# Patient Record
Sex: MALE | Race: BLACK OR AFRICAN AMERICAN | NOT HISPANIC OR LATINO | URBAN - METROPOLITAN AREA
[De-identification: names, ages, dates, MRNs, and addresses within clinical notes are randomized per-mention and may not be internally consistent; named-entity substitution may affect disease eponyms.]

---

## 2017-11-01 ENCOUNTER — INPATIENT (INPATIENT)
Facility: HOSPITAL | Age: 32
LOS: 0 days | Discharge: HOME | DRG: 638 | End: 2017-11-02
Attending: HOSPITALIST | Admitting: HOSPITALIST
Payer: COMMERCIAL

## 2017-11-01 VITALS
HEART RATE: 65 BPM | OXYGEN SATURATION: 98 % | SYSTOLIC BLOOD PRESSURE: 115 MMHG | RESPIRATION RATE: 18 BRPM | TEMPERATURE: 97 F | WEIGHT: 154.98 LBS | HEIGHT: 68 IN | DIASTOLIC BLOOD PRESSURE: 90 MMHG

## 2017-11-01 DIAGNOSIS — Z29.9 ENCOUNTER FOR PROPHYLACTIC MEASURES, UNSPECIFIED: ICD-10-CM

## 2017-11-01 DIAGNOSIS — E11.01 TYPE 2 DIABETES MELLITUS WITH HYPEROSMOLARITY WITH COMA: ICD-10-CM

## 2017-11-01 DIAGNOSIS — E11.9 TYPE 2 DIABETES MELLITUS WITHOUT COMPLICATIONS: ICD-10-CM

## 2017-11-01 DIAGNOSIS — N28.9 DISORDER OF KIDNEY AND URETER, UNSPECIFIED: ICD-10-CM

## 2017-11-01 LAB
ACETONE SERPL-MCNC: ABNORMAL
ALBUMIN SERPL ELPH-MCNC: 3.6 G/DL — SIGNIFICANT CHANGE UP (ref 3.5–5)
ALBUMIN SERPL ELPH-MCNC: 4.2 G/DL — SIGNIFICANT CHANGE UP (ref 3.5–5)
ALP SERPL-CCNC: 148 U/L — HIGH (ref 40–120)
ALP SERPL-CCNC: 212 U/L — HIGH (ref 40–120)
ALT FLD-CCNC: 42 U/L DA — SIGNIFICANT CHANGE UP (ref 10–60)
ALT FLD-CCNC: 48 U/L DA — SIGNIFICANT CHANGE UP (ref 10–60)
ANION GAP SERPL CALC-SCNC: 11 MMOL/L — SIGNIFICANT CHANGE UP (ref 5–17)
ANION GAP SERPL CALC-SCNC: 7 MMOL/L — SIGNIFICANT CHANGE UP (ref 5–17)
APPEARANCE UR: CLEAR — SIGNIFICANT CHANGE UP
AST SERPL-CCNC: 25 U/L — SIGNIFICANT CHANGE UP (ref 10–40)
AST SERPL-CCNC: 29 U/L — SIGNIFICANT CHANGE UP (ref 10–40)
BASE EXCESS BLDV CALC-SCNC: 0.7 MMOL/L — SIGNIFICANT CHANGE UP (ref -2–2)
BASOPHILS # BLD AUTO: 0.1 K/UL — SIGNIFICANT CHANGE UP (ref 0–0.2)
BASOPHILS # BLD AUTO: 0.1 K/UL — SIGNIFICANT CHANGE UP (ref 0–0.2)
BASOPHILS NFR BLD AUTO: 0.6 % — SIGNIFICANT CHANGE UP (ref 0–2)
BASOPHILS NFR BLD AUTO: 0.8 % — SIGNIFICANT CHANGE UP (ref 0–2)
BILIRUB SERPL-MCNC: 1.1 MG/DL — SIGNIFICANT CHANGE UP (ref 0.2–1.2)
BILIRUB SERPL-MCNC: 1.3 MG/DL — HIGH (ref 0.2–1.2)
BILIRUB UR-MCNC: NEGATIVE — SIGNIFICANT CHANGE UP
BLOOD GAS COMMENTS, VENOUS: SIGNIFICANT CHANGE UP
BUN SERPL-MCNC: 26 MG/DL — HIGH (ref 7–18)
BUN SERPL-MCNC: 32 MG/DL — HIGH (ref 7–18)
CALCIUM SERPL-MCNC: 8.4 MG/DL — SIGNIFICANT CHANGE UP (ref 8.4–10.5)
CALCIUM SERPL-MCNC: 9.6 MG/DL — SIGNIFICANT CHANGE UP (ref 8.4–10.5)
CHLORIDE SERPL-SCNC: 111 MMOL/L — HIGH (ref 96–108)
CHLORIDE SERPL-SCNC: 98 MMOL/L — SIGNIFICANT CHANGE UP (ref 96–108)
CO2 SERPL-SCNC: 22 MMOL/L — SIGNIFICANT CHANGE UP (ref 22–31)
CO2 SERPL-SCNC: 24 MMOL/L — SIGNIFICANT CHANGE UP (ref 22–31)
COLOR SPEC: YELLOW — SIGNIFICANT CHANGE UP
CREAT SERPL-MCNC: 1.82 MG/DL — HIGH (ref 0.5–1.3)
CREAT SERPL-MCNC: 2.51 MG/DL — HIGH (ref 0.5–1.3)
DIFF PNL FLD: ABNORMAL
EOSINOPHIL # BLD AUTO: 0 K/UL — SIGNIFICANT CHANGE UP (ref 0–0.5)
EOSINOPHIL # BLD AUTO: 0.3 K/UL — SIGNIFICANT CHANGE UP (ref 0–0.5)
EOSINOPHIL NFR BLD AUTO: 0 % — SIGNIFICANT CHANGE UP (ref 0–6)
EOSINOPHIL NFR BLD AUTO: 1.5 % — SIGNIFICANT CHANGE UP (ref 0–6)
ETHANOL SERPL-MCNC: <3 MG/DL — SIGNIFICANT CHANGE UP (ref 0–10)
GLUCOSE SERPL-MCNC: 304 MG/DL — HIGH (ref 70–99)
GLUCOSE SERPL-MCNC: 699 MG/DL — SIGNIFICANT CHANGE UP (ref 70–99)
GLUCOSE UR QL: 1000 MG/DL
HBA1C BLD-MCNC: 7.5 % — HIGH (ref 4–5.6)
HCO3 BLDV-SCNC: 23 MMOL/L — SIGNIFICANT CHANGE UP (ref 21–29)
HCT VFR BLD CALC: 41.5 % — SIGNIFICANT CHANGE UP (ref 39–50)
HCT VFR BLD CALC: 46 % — SIGNIFICANT CHANGE UP (ref 39–50)
HGB BLD-MCNC: 13.8 G/DL — SIGNIFICANT CHANGE UP (ref 13–17)
HGB BLD-MCNC: 15.2 G/DL — SIGNIFICANT CHANGE UP (ref 13–17)
HOROWITZ INDEX BLDV+IHG-RTO: 21 — SIGNIFICANT CHANGE UP
KETONES UR-MCNC: ABNORMAL
LEUKOCYTE ESTERASE UR-ACNC: NEGATIVE — SIGNIFICANT CHANGE UP
LIDOCAIN IGE QN: 185 U/L — SIGNIFICANT CHANGE UP (ref 73–393)
LYMPHOCYTES # BLD AUTO: 0.7 K/UL — LOW (ref 1–3.3)
LYMPHOCYTES # BLD AUTO: 1.5 K/UL — SIGNIFICANT CHANGE UP (ref 1–3.3)
LYMPHOCYTES # BLD AUTO: 4.9 % — LOW (ref 13–44)
LYMPHOCYTES # BLD AUTO: 9.3 % — LOW (ref 13–44)
MCHC RBC-ENTMCNC: 27.1 PG — SIGNIFICANT CHANGE UP (ref 27–34)
MCHC RBC-ENTMCNC: 27.4 PG — SIGNIFICANT CHANGE UP (ref 27–34)
MCHC RBC-ENTMCNC: 33 GM/DL — SIGNIFICANT CHANGE UP (ref 32–36)
MCHC RBC-ENTMCNC: 33.1 GM/DL — SIGNIFICANT CHANGE UP (ref 32–36)
MCV RBC AUTO: 82 FL — SIGNIFICANT CHANGE UP (ref 80–100)
MCV RBC AUTO: 82.9 FL — SIGNIFICANT CHANGE UP (ref 80–100)
MONOCYTES # BLD AUTO: 0.3 K/UL — SIGNIFICANT CHANGE UP (ref 0–0.9)
MONOCYTES # BLD AUTO: 0.6 K/UL — SIGNIFICANT CHANGE UP (ref 0–0.9)
MONOCYTES NFR BLD AUTO: 2.4 % — SIGNIFICANT CHANGE UP (ref 2–14)
MONOCYTES NFR BLD AUTO: 3.5 % — SIGNIFICANT CHANGE UP (ref 2–14)
NEUTROPHILS # BLD AUTO: 12.9 K/UL — HIGH (ref 1.8–7.4)
NEUTROPHILS # BLD AUTO: 14 K/UL — HIGH (ref 1.8–7.4)
NEUTROPHILS NFR BLD AUTO: 84.9 % — HIGH (ref 43–77)
NEUTROPHILS NFR BLD AUTO: 92.1 % — HIGH (ref 43–77)
NITRITE UR-MCNC: NEGATIVE — SIGNIFICANT CHANGE UP
OSMOLALITY SERPL: 318 MOS/KG — HIGH (ref 275–300)
PCO2 BLDV: 31 MMHG — LOW (ref 35–50)
PH BLDV: 7.48 — HIGH (ref 7.35–7.45)
PH UR: 7 — SIGNIFICANT CHANGE UP (ref 5–8)
PLATELET # BLD AUTO: 127 K/UL — LOW (ref 150–400)
PLATELET # BLD AUTO: 163 K/UL — SIGNIFICANT CHANGE UP (ref 150–400)
PO2 BLDV: SIGNIFICANT CHANGE UP MMHG (ref 25–45)
POTASSIUM SERPL-MCNC: 4 MMOL/L — SIGNIFICANT CHANGE UP (ref 3.5–5.3)
POTASSIUM SERPL-MCNC: 4.2 MMOL/L — SIGNIFICANT CHANGE UP (ref 3.5–5.3)
POTASSIUM SERPL-SCNC: 4 MMOL/L — SIGNIFICANT CHANGE UP (ref 3.5–5.3)
POTASSIUM SERPL-SCNC: 4.2 MMOL/L — SIGNIFICANT CHANGE UP (ref 3.5–5.3)
PROT SERPL-MCNC: 6.6 G/DL — SIGNIFICANT CHANGE UP (ref 6–8.3)
PROT SERPL-MCNC: 7.8 G/DL — SIGNIFICANT CHANGE UP (ref 6–8.3)
PROT UR-MCNC: 30 MG/DL
RBC # BLD: 5.01 M/UL — SIGNIFICANT CHANGE UP (ref 4.2–5.8)
RBC # BLD: 5.61 M/UL — SIGNIFICANT CHANGE UP (ref 4.2–5.8)
RBC # FLD: 14.1 % — SIGNIFICANT CHANGE UP (ref 10.3–14.5)
RBC # FLD: 14.1 % — SIGNIFICANT CHANGE UP (ref 10.3–14.5)
SAO2 % BLDV: 32 % — LOW (ref 67–88)
SODIUM SERPL-SCNC: 131 MMOL/L — LOW (ref 135–145)
SODIUM SERPL-SCNC: 142 MMOL/L — SIGNIFICANT CHANGE UP (ref 135–145)
SP GR SPEC: 1 — LOW (ref 1.01–1.02)
UROBILINOGEN FLD QL: NEGATIVE — SIGNIFICANT CHANGE UP
WBC # BLD: 14 K/UL — HIGH (ref 3.8–10.5)
WBC # BLD: 16.5 K/UL — HIGH (ref 3.8–10.5)
WBC # FLD AUTO: 14 K/UL — HIGH (ref 3.8–10.5)
WBC # FLD AUTO: 16.5 K/UL — HIGH (ref 3.8–10.5)

## 2017-11-01 PROCEDURE — 99285 EMERGENCY DEPT VISIT HI MDM: CPT

## 2017-11-01 PROCEDURE — 71010: CPT | Mod: 26

## 2017-11-01 PROCEDURE — 99223 1ST HOSP IP/OBS HIGH 75: CPT | Mod: GC

## 2017-11-01 RX ORDER — ONDANSETRON 8 MG/1
4 TABLET, FILM COATED ORAL ONCE
Qty: 0 | Refills: 0 | Status: COMPLETED | OUTPATIENT
Start: 2017-11-01 | End: 2017-11-01

## 2017-11-01 RX ORDER — DEXTROSE 50 % IN WATER 50 %
12.5 SYRINGE (ML) INTRAVENOUS ONCE
Qty: 0 | Refills: 0 | Status: DISCONTINUED | OUTPATIENT
Start: 2017-11-01 | End: 2017-11-02

## 2017-11-01 RX ORDER — INSULIN LISPRO 100/ML
4 VIAL (ML) SUBCUTANEOUS
Qty: 0 | Refills: 0 | Status: DISCONTINUED | OUTPATIENT
Start: 2017-11-01 | End: 2017-11-01

## 2017-11-01 RX ORDER — GLUCAGON INJECTION, SOLUTION 0.5 MG/.1ML
1 INJECTION, SOLUTION SUBCUTANEOUS ONCE
Qty: 0 | Refills: 0 | Status: DISCONTINUED | OUTPATIENT
Start: 2017-11-01 | End: 2017-11-02

## 2017-11-01 RX ORDER — SODIUM CHLORIDE 9 MG/ML
500 INJECTION INTRAMUSCULAR; INTRAVENOUS; SUBCUTANEOUS ONCE
Qty: 0 | Refills: 0 | Status: COMPLETED | OUTPATIENT
Start: 2017-11-01 | End: 2017-11-01

## 2017-11-01 RX ORDER — SODIUM CHLORIDE 9 MG/ML
2000 INJECTION INTRAMUSCULAR; INTRAVENOUS; SUBCUTANEOUS ONCE
Qty: 0 | Refills: 0 | Status: COMPLETED | OUTPATIENT
Start: 2017-11-01 | End: 2017-11-01

## 2017-11-01 RX ORDER — METOCLOPRAMIDE HCL 10 MG
5 TABLET ORAL EVERY 12 HOURS
Qty: 0 | Refills: 0 | Status: DISCONTINUED | OUTPATIENT
Start: 2017-11-01 | End: 2017-11-02

## 2017-11-01 RX ORDER — OXYCODONE AND ACETAMINOPHEN 5; 325 MG/1; MG/1
1 TABLET ORAL ONCE
Qty: 0 | Refills: 0 | Status: DISCONTINUED | OUTPATIENT
Start: 2017-11-01 | End: 2017-11-01

## 2017-11-01 RX ORDER — ONDANSETRON 8 MG/1
4 TABLET, FILM COATED ORAL EVERY 8 HOURS
Qty: 0 | Refills: 0 | Status: DISCONTINUED | OUTPATIENT
Start: 2017-11-01 | End: 2017-11-02

## 2017-11-01 RX ORDER — SODIUM CHLORIDE 9 MG/ML
1000 INJECTION INTRAMUSCULAR; INTRAVENOUS; SUBCUTANEOUS
Qty: 0 | Refills: 0 | Status: DISCONTINUED | OUTPATIENT
Start: 2017-11-01 | End: 2017-11-02

## 2017-11-01 RX ORDER — INSULIN GLARGINE 100 [IU]/ML
20 INJECTION, SOLUTION SUBCUTANEOUS AT BEDTIME
Qty: 0 | Refills: 0 | Status: DISCONTINUED | OUTPATIENT
Start: 2017-11-01 | End: 2017-11-02

## 2017-11-01 RX ORDER — ONDANSETRON 8 MG/1
4 TABLET, FILM COATED ORAL EVERY 6 HOURS
Qty: 0 | Refills: 0 | Status: DISCONTINUED | OUTPATIENT
Start: 2017-11-01 | End: 2017-11-01

## 2017-11-01 RX ORDER — DEXTROSE 50 % IN WATER 50 %
25 SYRINGE (ML) INTRAVENOUS ONCE
Qty: 0 | Refills: 0 | Status: DISCONTINUED | OUTPATIENT
Start: 2017-11-01 | End: 2017-11-02

## 2017-11-01 RX ORDER — DEXTROSE 50 % IN WATER 50 %
1 SYRINGE (ML) INTRAVENOUS ONCE
Qty: 0 | Refills: 0 | Status: DISCONTINUED | OUTPATIENT
Start: 2017-11-01 | End: 2017-11-02

## 2017-11-01 RX ORDER — INSULIN LISPRO 100/ML
5 VIAL (ML) SUBCUTANEOUS
Qty: 0 | Refills: 0 | Status: DISCONTINUED | OUTPATIENT
Start: 2017-11-01 | End: 2017-11-02

## 2017-11-01 RX ORDER — SODIUM CHLORIDE 9 MG/ML
1000 INJECTION, SOLUTION INTRAVENOUS
Qty: 0 | Refills: 0 | Status: DISCONTINUED | OUTPATIENT
Start: 2017-11-01 | End: 2017-11-02

## 2017-11-01 RX ORDER — MORPHINE SULFATE 50 MG/1
4 CAPSULE, EXTENDED RELEASE ORAL ONCE
Qty: 0 | Refills: 0 | Status: DISCONTINUED | OUTPATIENT
Start: 2017-11-01 | End: 2017-11-01

## 2017-11-01 RX ORDER — INSULIN HUMAN 100 [IU]/ML
10 INJECTION, SOLUTION SUBCUTANEOUS ONCE
Qty: 0 | Refills: 0 | Status: COMPLETED | OUTPATIENT
Start: 2017-11-01 | End: 2017-11-01

## 2017-11-01 RX ORDER — INSULIN LISPRO 100/ML
VIAL (ML) SUBCUTANEOUS
Qty: 0 | Refills: 0 | Status: DISCONTINUED | OUTPATIENT
Start: 2017-11-01 | End: 2017-11-01

## 2017-11-01 RX ORDER — SODIUM CHLORIDE 9 MG/ML
1000 INJECTION INTRAMUSCULAR; INTRAVENOUS; SUBCUTANEOUS
Qty: 0 | Refills: 0 | Status: DISCONTINUED | OUTPATIENT
Start: 2017-11-01 | End: 2017-11-01

## 2017-11-01 RX ORDER — INSULIN LISPRO 100/ML
VIAL (ML) SUBCUTANEOUS EVERY 4 HOURS
Qty: 0 | Refills: 0 | Status: DISCONTINUED | OUTPATIENT
Start: 2017-11-01 | End: 2017-11-02

## 2017-11-01 RX ADMIN — ONDANSETRON 4 MILLIGRAM(S): 8 TABLET, FILM COATED ORAL at 10:26

## 2017-11-01 RX ADMIN — INSULIN GLARGINE 20 UNIT(S): 100 INJECTION, SOLUTION SUBCUTANEOUS at 22:06

## 2017-11-01 RX ADMIN — ONDANSETRON 4 MILLIGRAM(S): 8 TABLET, FILM COATED ORAL at 13:03

## 2017-11-01 RX ADMIN — Medication 3: at 22:05

## 2017-11-01 RX ADMIN — Medication 5: at 16:22

## 2017-11-01 RX ADMIN — Medication 5 MILLIGRAM(S): at 17:40

## 2017-11-01 RX ADMIN — SODIUM CHLORIDE 1000 MILLILITER(S): 9 INJECTION INTRAMUSCULAR; INTRAVENOUS; SUBCUTANEOUS at 11:44

## 2017-11-01 RX ADMIN — Medication 5 UNIT(S): at 16:22

## 2017-11-01 RX ADMIN — OXYCODONE AND ACETAMINOPHEN 1 TABLET(S): 5; 325 TABLET ORAL at 11:35

## 2017-11-01 RX ADMIN — INSULIN HUMAN 10 UNIT(S): 100 INJECTION, SOLUTION SUBCUTANEOUS at 09:10

## 2017-11-01 RX ADMIN — ONDANSETRON 4 MILLIGRAM(S): 8 TABLET, FILM COATED ORAL at 09:10

## 2017-11-01 RX ADMIN — SODIUM CHLORIDE 2000 MILLILITER(S): 9 INJECTION INTRAMUSCULAR; INTRAVENOUS; SUBCUTANEOUS at 09:10

## 2017-11-01 NOTE — ED PROVIDER NOTE - PROGRESS NOTE DETAILS
No DKA on labs, pt with hyperosmolar hyperglycemic state, also with renal insufficency on labs with creat of 2.51 (unknown baseline).  pt to be admitted for further treatment/ electrolyte monitoring.

## 2017-11-01 NOTE — H&P ADULT - PROBLEM SELECTOR PLAN 3
OLGA vs CKD  - BUN/Cr< 20  - Baseline creatinine unknown  - s/p 2L NS  - will repeat BMP   - f/u urine studies  - Consider renal US  - Avoid NSAIDs, nephrotoxic medications, Contrast etc OLGA with possible underlying  CKD  - BUN/Cr< 20  - Baseline creatinine unknown  - s/p 2L NS  - will repeat BMP   - f/u urine studies  - Consider renal US  - Avoid NSAIDs, nephrotoxic medications, Contrast etc

## 2017-11-01 NOTE — H&P ADULT - ATTENDING COMMENTS
Patient was seen and examined by myself with team. Case was discussed with house staff in details.  This is a 32 y/o M presenting with nausea and vomiting with polyuria found to have hyperglycemia admitted for   1. Uncontrolled diabetes with hyperosmolar state  2. acute renal failure    - IV fluid bolus given in the ED  - will continue with maintenance at 125mls /hr x 12 hrs and then reassess  - monitor blood glucose every 4 hrs  - Insulin therapy- started on Lantus and pre-meal Humalog with sliding scale.  monitor f/s and adjust insulin dose accordingly  - endo consult and diabetic teaching  - Monitor renal functions  - repeat labs in am and PRN to monitor electrolytes  - reglan and PRN Zofran for nausea and vomiting  - Clear liquids diet today. advance diet as tolerated.  Plan discussed with patient in details at bedside and all questions + concerns were addressed.  Other plan as outlined above.

## 2017-11-01 NOTE — H&P ADULT - NSHPPHYSICALEXAM_GEN_ALL_CORE
Vital Signs Last 24 Hrs  T(C): 36.6 (01 Nov 2017 11:56), Max: 36.6 (01 Nov 2017 11:56)  T(F): 97.8 (01 Nov 2017 11:56), Max: 97.8 (01 Nov 2017 11:56)  HR: 79 (01 Nov 2017 11:56) (65 - 79)  BP: 154/82 (01 Nov 2017 11:56) (115/90 - 154/82)  BP(mean): --  RR: 19 (01 Nov 2017 11:56) (18 - 19)  SpO2: 100% (01 Nov 2017 11:56) (98% - 100%)    GENERAL: NAD  HEAD:  Atraumatic, Normocephalic  EYES: EOMI, PERRLA, conjunctiva and sclera clear  ENMT: Moist mucous membranes  NECK: Supple  NERVOUS SYSTEM:  Alert & Oriented X3  CHEST/LUNG: Clear to auscultation bilaterally; No rales, rhonchi, wheezing, or rubs  HEART: Regular rate and rhythm; No murmurs, rubs, or gallops  ABDOMEN: Soft, Nontender, Nondistended; Bowel sounds present  EXTREMITIES:  2+ Peripheral Pulses, No clubbing, cyanosis, or edema  LYMPH: No lymphadenopathy noted  SKIN: No rashes or lesions

## 2017-11-01 NOTE — H&P ADULT - PROBLEM SELECTOR PLAN 1
On adm, BG>600, pH-7.48, HCO3- 23, Ketone- small, No AGAP  Normal mental status, not confused or lethargic  - Likely 2/2 medication noncompliance  - s/p 2L NS bolus and 10U regular insulin  - BG- improving, Electrolytes- wnl  - Accucheck q4 hr  - will give 4U stat of lispro  - WIll start Lantus 20U qHS, Humalog 4U premeal, tid and moderate dose sliding scale On adm, BG>600, pH-7.48, HCO3- 23, Ketone- small, No AGAP  Normal mental status, not confused or lethargic  - Likely 2/2 medication noncompliance  - s/p 2L NS bolus and 10U regular insulin  - BG- improving, Electrolytes- wnl  - Accucheck q4 hr  - will give 4U stat of lispro  - WIll start Lantus 20U qHS, Humalog 5U premeal tid, and Lowdose insulin sliding scale q4hr   - start NS@125ml/hr  - start Reglan 5mg q12  - Zofran q8hr PRN nausea or vomiting  - clear liquid diet for today, advance as tolerated

## 2017-11-01 NOTE — H&P ADULT - ASSESSMENT
30yo M with h/o type 1 DM came in due to polyuria x 1 day. He said he did not take his insulin yesterday and was not feeling well. He knew his blood glucose is high  because he had to urinate multiple times yesterday. This morning around 7am he had nausea, and persistent vomiting and diarrhea with mild abdominal pain so he decided to come to the ER. He denies confusion, lethargy or disorientation. He denies any more episodes of vomiting or diarrhea and said he feels better now. Patient is noncompliant with his insulin. He said he "ran out of insulin" and then said he just didn't feel like taking out. He also states not being compliant with carbohydrate consistent diet lately. He does not have a PCP. The last time he saw a physician was more then a year ago. Also states requiring insulin drip for hyperglycemia about 6 years ago.

## 2017-11-01 NOTE — ED ADULT NURSE NOTE - OBJECTIVE STATEMENT
AcOx3  ambulatory c/o sudden onset abd pain nausea and vomiting 2h PTA F/S on arrival HI reports PMX DM BW completed attached to cardiac monitor IVF in progress

## 2017-11-01 NOTE — H&P ADULT - NSHPLABSRESULTS_GEN_ALL_CORE
13.8   14.0  )-----------( 127      ( 01 Nov 2017 12:12 )             41.5   11-01    142  |  111<H>  |  26<H>  ----------------------------<  304<H>  4.0   |  24  |  x     Ca    8.4      01 Nov 2017 12:12    TPro  x   /  Alb  3.6  /  TBili  x   /  DBili  x   /  AST  x   /  ALT  x   /  AlkPhos  x   11-01    Blood Gas Profile - Venous (11.01.17 @ 09:07)    pH, Venous: 7.48    pCO2, Venous: 31 mmHg    pO2, Venous: not reportable mmHg    HCO3, Venous: 23 mmol/L    Base Excess, Venous: 0.7 mmol/L    Oxygen Saturation, Venous: 32 %    FIO2, Venous: 21.0    Blood Gas Comments, Venous: room air drawn by nursing    < from: Xray Chest 1 View AP/PA (11.01.17 @ 12:20) >      EXAM:  CHEST SINGLE AP OR PA                            PROCEDURE DATE:  11/01/2017          INTERPRETATION:  Portable chest x-ray    Clinical Indication: Vomiting    Comparison: None    There is no acute pulmonary infiltrate, pleural effusion, or   pneumothorax. The trachea is midline. The cardiac silhouette is within   normal limits. No pulmonary vascular congestion.    Impression: No radiographic evidence for acute cardiopulmonary disease.    < end of copied text >

## 2017-11-01 NOTE — H&P ADULT - NSHPREVIEWOFSYSTEMS_GEN_ALL_CORE
REVIEW OF SYSTEMS:  CONSTITUTIONAL: No fever, weight loss, or fatigue  EYES: No eye pain, visual disturbances, or discharge  ENMT:  No difficulty hearing, tinnitus, vertigo; No sinus or throat pain  NECK: No pain or stiffness  BREASTS: No pain, masses, or nipple discharge  RESPIRATORY: No cough, wheezing, chills or hemoptysis; No shortness of breath  CARDIOVASCULAR: No chest pain, palpitations, dizziness, or leg swelling  GASTROINTESTINAL: nausea, vomiting, and diarrhea. No abdominal or epigastric pain. No hematemesis, melena or hematochezia.  GENITOURINARY: increased frequency. No dysuria, hematuria, or incontinence  NEUROLOGICAL: No headaches, memory loss, loss of strength, numbness, or tremors  SKIN: No itching, burning, rashes, or lesions   MUSCULOSKELETAL: No joint pain or swelling; No muscle, back, or extremity pain REVIEW OF SYSTEMS:  CONSTITUTIONAL: No fever, weight loss, or fatigue  EYES: No eye pain, visual disturbances, or discharge  ENMT:  No difficulty hearing, tinnitus, vertigo; No sinus or throat pain  NECK: No pain or stiffness  BREASTS: No pain, masses, or nipple discharge  RESPIRATORY: No cough, wheezing, chills or hemoptysis; No shortness of breath  CARDIOVASCULAR: No chest pain, palpitations, dizziness, or leg swelling  GASTROINTESTINAL: nausea+,  vomiting+, and diarrhea. No abdominal or epigastric pain. No hematemesis, melena or hematochezia.  GENITOURINARY: increased frequency. No dysuria, hematuria, or incontinence  NEUROLOGICAL: No headaches, memory loss, loss of strength, numbness, or tremors  SKIN: No itching, burning, rashes, or lesions   MUSCULOSKELETAL: No joint pain or swelling; No muscle, back, or extremity pain

## 2017-11-01 NOTE — ED PROVIDER NOTE - OBJECTIVE STATEMENT
32 y/o M w/ Phx of DM, previous admission for DKA, presents to ED c/o nausea, vomiting, diarrhea x 3am today.  Pt reports to be compliant w/ insulin though he did not have any today.  Pt denies fever, chest pain, SOB.  Unknown food exposure.  NKDA.

## 2017-11-01 NOTE — H&P ADULT - HISTORY OF PRESENT ILLNESS
30yo M with h/o type 1 DM came in due to polyuria x 1 day. He said he did not take his insulin yesterday and was not feeling well. He knew his blood glucose is high  because he had to urinate multiple times yesterday. This morning around 7am he had nausea, and persistent vomiting and diarrhea with mild abdominal pain so he decided to come to the ER. He denies confusion, lethargy or disorientation. He denies any more episodes of vomiting or diarrhea and said he feels better now. Patient is noncompliant with his insulin. He said he "ran out of insulin" and then said he just didn't feel like taking out. He also states not being compliant with carbohydrate consistent diet lately. He does not have a PCP. The last time he saw a physician was more then a year ago. Also states requiring insulin drip for hyperglycemia about 6 years ago.    FH: not significant  SH: smokes weed everyday, Alcohol occasionally. Lives with his grandmother  Medications: Insulin 30U qAM, Humalog 8-10U premeal TID  Pharmacy: [facility] on [address], [facility]

## 2017-11-01 NOTE — ED PROVIDER NOTE - CARE PLAN
Principal Discharge DX:	Hyperosmolar non-ketotic state in patient with type 2 diabetes mellitus  Secondary Diagnosis:	Renal insufficiency

## 2017-11-01 NOTE — H&P ADULT - PROBLEM SELECTOR PLAN 2
f/u A1c  - Plan as above  - WIll start Lantus 20U qHS, humalog 4U premeal tid and moderate dose sliding scale  - Nutrition consult  - f/u lipid panel  - f/u TSH f/u A1c  - Plan as above  - WIll start Lantus 20U qHS, humalog 5U premeal tid and low dose sliding scale q4hr  - Nutrition consult  - f/u lipid panel  - f/u TSH

## 2017-11-02 VITALS
DIASTOLIC BLOOD PRESSURE: 82 MMHG | TEMPERATURE: 97 F | OXYGEN SATURATION: 100 % | SYSTOLIC BLOOD PRESSURE: 149 MMHG | HEART RATE: 54 BPM | RESPIRATION RATE: 16 BRPM

## 2017-11-02 LAB
ANION GAP SERPL CALC-SCNC: 9 MMOL/L — SIGNIFICANT CHANGE UP (ref 5–17)
BASOPHILS # BLD AUTO: 0.1 K/UL — SIGNIFICANT CHANGE UP (ref 0–0.2)
BASOPHILS NFR BLD AUTO: 0.9 % — SIGNIFICANT CHANGE UP (ref 0–2)
BUN SERPL-MCNC: 18 MG/DL — SIGNIFICANT CHANGE UP (ref 7–18)
CALCIUM SERPL-MCNC: 8.6 MG/DL — SIGNIFICANT CHANGE UP (ref 8.4–10.5)
CHLORIDE SERPL-SCNC: 112 MMOL/L — HIGH (ref 96–108)
CHLORIDE UR-SCNC: 70 MMOL/L — SIGNIFICANT CHANGE UP (ref 55–125)
CO2 SERPL-SCNC: 23 MMOL/L — SIGNIFICANT CHANGE UP (ref 22–31)
CREAT ?TM UR-MCNC: 128 MG/DL — SIGNIFICANT CHANGE UP
CREAT SERPL-MCNC: 1.66 MG/DL — HIGH (ref 0.5–1.3)
EOSINOPHIL # BLD AUTO: 0.1 K/UL — SIGNIFICANT CHANGE UP (ref 0–0.5)
EOSINOPHIL NFR BLD AUTO: 0.6 % — SIGNIFICANT CHANGE UP (ref 0–6)
GLUCOSE SERPL-MCNC: 176 MG/DL — HIGH (ref 70–99)
HCT VFR BLD CALC: 40.9 % — SIGNIFICANT CHANGE UP (ref 39–50)
HGB BLD-MCNC: 13.5 G/DL — SIGNIFICANT CHANGE UP (ref 13–17)
LYMPHOCYTES # BLD AUTO: 1.6 K/UL — SIGNIFICANT CHANGE UP (ref 1–3.3)
LYMPHOCYTES # BLD AUTO: 13.7 % — SIGNIFICANT CHANGE UP (ref 13–44)
MCHC RBC-ENTMCNC: 27.1 PG — SIGNIFICANT CHANGE UP (ref 27–34)
MCHC RBC-ENTMCNC: 33 GM/DL — SIGNIFICANT CHANGE UP (ref 32–36)
MCV RBC AUTO: 82 FL — SIGNIFICANT CHANGE UP (ref 80–100)
MONOCYTES # BLD AUTO: 0.5 K/UL — SIGNIFICANT CHANGE UP (ref 0–0.9)
MONOCYTES NFR BLD AUTO: 4.3 % — SIGNIFICANT CHANGE UP (ref 2–14)
NEUTROPHILS # BLD AUTO: 9.6 K/UL — HIGH (ref 1.8–7.4)
NEUTROPHILS NFR BLD AUTO: 80.5 % — HIGH (ref 43–77)
OSMOLALITY UR: 706 MOS/KG — SIGNIFICANT CHANGE UP (ref 50–1200)
PCP SPEC-MCNC: SIGNIFICANT CHANGE UP
PLATELET # BLD AUTO: 160 K/UL — SIGNIFICANT CHANGE UP (ref 150–400)
POTASSIUM SERPL-MCNC: 3.6 MMOL/L — SIGNIFICANT CHANGE UP (ref 3.5–5.3)
POTASSIUM SERPL-SCNC: 3.6 MMOL/L — SIGNIFICANT CHANGE UP (ref 3.5–5.3)
POTASSIUM UR-SCNC: 26 MMOL/L — SIGNIFICANT CHANGE UP (ref 25–125)
RBC # BLD: 4.98 M/UL — SIGNIFICANT CHANGE UP (ref 4.2–5.8)
RBC # FLD: 13.8 % — SIGNIFICANT CHANGE UP (ref 10.3–14.5)
SODIUM SERPL-SCNC: 144 MMOL/L — SIGNIFICANT CHANGE UP (ref 135–145)
SODIUM UR-SCNC: 71 MMOL/L — SIGNIFICANT CHANGE UP (ref 40–220)
UUN UR-MCNC: 854 MG/DL — SIGNIFICANT CHANGE UP
WBC # BLD: 11.9 K/UL — HIGH (ref 3.8–10.5)
WBC # FLD AUTO: 11.9 K/UL — HIGH (ref 3.8–10.5)

## 2017-11-02 PROCEDURE — 96374 THER/PROPH/DIAG INJ IV PUSH: CPT

## 2017-11-02 PROCEDURE — 83935 ASSAY OF URINE OSMOLALITY: CPT

## 2017-11-02 PROCEDURE — 84443 ASSAY THYROID STIM HORMONE: CPT

## 2017-11-02 PROCEDURE — 96376 TX/PRO/DX INJ SAME DRUG ADON: CPT

## 2017-11-02 PROCEDURE — 82570 ASSAY OF URINE CREATININE: CPT

## 2017-11-02 PROCEDURE — 84540 ASSAY OF URINE/UREA-N: CPT

## 2017-11-02 PROCEDURE — 71045 X-RAY EXAM CHEST 1 VIEW: CPT

## 2017-11-02 PROCEDURE — 81001 URINALYSIS AUTO W/SCOPE: CPT

## 2017-11-02 PROCEDURE — 82436 ASSAY OF URINE CHLORIDE: CPT

## 2017-11-02 PROCEDURE — 82962 GLUCOSE BLOOD TEST: CPT

## 2017-11-02 PROCEDURE — 82803 BLOOD GASES ANY COMBINATION: CPT

## 2017-11-02 PROCEDURE — 80307 DRUG TEST PRSMV CHEM ANLYZR: CPT

## 2017-11-02 PROCEDURE — 96375 TX/PRO/DX INJ NEW DRUG ADDON: CPT

## 2017-11-02 PROCEDURE — 85027 COMPLETE CBC AUTOMATED: CPT

## 2017-11-02 PROCEDURE — 99285 EMERGENCY DEPT VISIT HI MDM: CPT | Mod: 25

## 2017-11-02 PROCEDURE — 83690 ASSAY OF LIPASE: CPT

## 2017-11-02 PROCEDURE — 99239 HOSP IP/OBS DSCHRG MGMT >30: CPT

## 2017-11-02 PROCEDURE — 83930 ASSAY OF BLOOD OSMOLALITY: CPT

## 2017-11-02 PROCEDURE — 80048 BASIC METABOLIC PNL TOTAL CA: CPT

## 2017-11-02 PROCEDURE — 82009 KETONE BODYS QUAL: CPT

## 2017-11-02 PROCEDURE — 80061 LIPID PANEL: CPT

## 2017-11-02 PROCEDURE — 83036 HEMOGLOBIN GLYCOSYLATED A1C: CPT

## 2017-11-02 PROCEDURE — 93005 ELECTROCARDIOGRAM TRACING: CPT

## 2017-11-02 PROCEDURE — 84300 ASSAY OF URINE SODIUM: CPT

## 2017-11-02 PROCEDURE — 84133 ASSAY OF URINE POTASSIUM: CPT

## 2017-11-02 PROCEDURE — 80053 COMPREHEN METABOLIC PANEL: CPT

## 2017-11-02 RX ORDER — INSULIN GLARGINE 100 [IU]/ML
30 INJECTION, SOLUTION SUBCUTANEOUS
Qty: 0 | Refills: 0 | COMMUNITY

## 2017-11-02 RX ORDER — INSULIN LISPRO 100/ML
VIAL (ML) SUBCUTANEOUS
Qty: 0 | Refills: 0 | Status: DISCONTINUED | OUTPATIENT
Start: 2017-11-02 | End: 2017-11-02

## 2017-11-02 RX ORDER — INSULIN LISPRO 100/ML
6 VIAL (ML) SUBCUTANEOUS
Qty: 0 | Refills: 0 | COMMUNITY
Start: 2017-11-02

## 2017-11-02 RX ORDER — INSULIN LISPRO 100/ML
6 VIAL (ML) SUBCUTANEOUS
Qty: 0 | Refills: 0 | Status: DISCONTINUED | OUTPATIENT
Start: 2017-11-02 | End: 2017-11-02

## 2017-11-02 RX ORDER — INSULIN LISPRO 100/ML
8 VIAL (ML) SUBCUTANEOUS
Qty: 0 | Refills: 0 | COMMUNITY

## 2017-11-02 RX ORDER — INSULIN LISPRO 100/ML
6 VIAL (ML) SUBCUTANEOUS
Qty: 6 | Refills: 0
Start: 2017-11-02 | End: 2017-12-02

## 2017-11-02 RX ORDER — INSULIN GLARGINE 100 [IU]/ML
23 INJECTION, SOLUTION SUBCUTANEOUS AT BEDTIME
Qty: 0 | Refills: 0 | Status: DISCONTINUED | OUTPATIENT
Start: 2017-11-02 | End: 2017-11-02

## 2017-11-02 RX ORDER — INSULIN GLARGINE 100 [IU]/ML
23 INJECTION, SOLUTION SUBCUTANEOUS
Qty: 8 | Refills: 0
Start: 2017-11-02 | End: 2017-12-02

## 2017-11-02 RX ORDER — INSULIN GLARGINE 100 [IU]/ML
23 INJECTION, SOLUTION SUBCUTANEOUS
Qty: 0 | Refills: 0 | COMMUNITY
Start: 2017-11-02

## 2017-11-02 RX ADMIN — Medication 6 UNIT(S): at 11:45

## 2017-11-02 RX ADMIN — Medication 2: at 02:15

## 2017-11-02 RX ADMIN — Medication 1: at 06:33

## 2017-11-02 RX ADMIN — Medication 5 MILLIGRAM(S): at 07:15

## 2017-11-02 RX ADMIN — SODIUM CHLORIDE 125 MILLILITER(S): 9 INJECTION INTRAMUSCULAR; INTRAVENOUS; SUBCUTANEOUS at 14:47

## 2017-11-02 RX ADMIN — Medication 5 UNIT(S): at 06:33

## 2017-11-02 NOTE — CONSULT NOTE ADULT - ASSESSMENT
30yo M with h/o type 1 DM came in due to polyuria x 1 day. He said he did not take his insulin yesterday and was not feeling well. He knew his blood glucose is high  because he had to urinate multiple times yesterday. Pt had nausea, and persistent vomiting and diarrhea with mild abdominal pain so he decided to come to the ER. Found to have un cont dm   Pt does not check fsg as out pt. admits to having hypo symptoms and tx accordingly

## 2017-11-02 NOTE — PROGRESS NOTE ADULT - PROBLEM SELECTOR PLAN 2
HbA1c 7.5  - on Lantus 23U qHS, humalog 6U premeal tid and low dose sliding scale AC/HS  - Nutrition consult  -  lipid panel nl,  TSH nl

## 2017-11-02 NOTE — PROGRESS NOTE ADULT - PROBLEM SELECTOR PLAN 1
On adm, BG>600, pH-7.48, HCO3- 23, Ketone- small, No AGAP, Normal mental status, Likely 2/2 medication noncompliance  - s/p 2L NS bolus and 10U regular insulin  - BG- improving, Electrolytes- wnl  - on Lantus 23U qHS, Humalog 6U premeal tid, and Lowdose insulin sliding scale AC/HS  -on NS@125ml/hr  -on Reglan 5mg q12  - Zofran q8hr PRN nausea or vomiting  - diabetic diet

## 2017-11-02 NOTE — DISCHARGE NOTE ADULT - MEDICATION SUMMARY - MEDICATIONS TO TAKE
I will START or STAY ON the medications listed below when I get home from the hospital:    insulin glargine  -- 23 unit(s) subcutaneous once a day (at bedtime)  -- Indication: For DKA (diabetic ketoacidoses)    insulin lispro 100 units/mL subcutaneous solution  -- 6 unit(s) subcutaneous 3 times a day (before meals)  -- Indication: For DKA (diabetic ketoacidoses) I will START or STAY ON the medications listed below when I get home from the hospital:    Lantus 100 units/mL subcutaneous solution  -- 23 unit(s) subcutaneous once a day (at bedtime)   -- Do not drink alcoholic beverages when taking this medication.  It is very important that you take or use this exactly as directed.  Do not skip doses or discontinue unless directed by your doctor.  Keep in refrigerator.  Do not freeze.    -- Indication: For DM (diabetes mellitus)    HumaLOG 100 units/mL subcutaneous solution  -- 6 unit(s) subcutaneous 3 times a day (before meals)   -- Do not drink alcoholic beverages when taking this medication.  It is very important that you take or use this exactly as directed.  Do not skip doses or discontinue unless directed by your doctor.  Keep in refrigerator.  Do not freeze.    -- Indication: For DM (diabetes mellitus)

## 2017-11-02 NOTE — CONSULT NOTE ADULT - SUBJECTIVE AND OBJECTIVE BOX
Patient is a 31y old  Male who presents with a chief complaint of polyuria, nausea, vomiting, and diarrhea (2017 11:35)      HPI:  30yo M with h/o type 1 DM came in due to polyuria x 1 day. He said he did not take his insulin yesterday and was not feeling well. He knew his blood glucose is high  because he had to urinate multiple times yesterday. This morning around 7am he had nausea, and persistent vomiting and diarrhea with mild abdominal pain so he decided to come to the ER. He denies confusion, lethargy or disorientation. He denies any more episodes of vomiting or diarrhea and said he feels better now. Patient is noncompliant with his insulin. He said he "ran out of insulin" and then said he just didn't feel like taking out. He also states not being compliant with carbohydrate consistent diet lately. He does not have a PCP. The last time he saw a physician was more then a year ago. Also states requiring insulin drip for hyperglycemia about 6 years ago. Pt does not check fsg as out pt. admits to having hypo symptoms and tx accordingly    FH: not significant  SH: smokes weed everyday, Alcohol occasionally. Lives with his grandmother  Medications: Insulin 30U qAM, Humalog 8-10U premeal TID  [facility]      PAST MEDICAL & SURGICAL HISTORY:  DKA (diabetic ketoacidoses)  DM (diabetes mellitus)  No significant past surgical history         MEDICATIONS  (STANDING):  dextrose 5%. 1000 milliLiter(s) (50 mL/Hr) IV Continuous <Continuous>  dextrose 50% Injectable 12.5 Gram(s) IV Push once  dextrose 50% Injectable 25 Gram(s) IV Push once  dextrose 50% Injectable 25 Gram(s) IV Push once  insulin glargine Injectable (LANTUS) 23 Unit(s) SubCutaneous at bedtime  insulin lispro (HumaLOG) corrective regimen sliding scale   SubCutaneous Before meals and at bedtime  insulin lispro Injectable (HumaLOG) 6 Unit(s) SubCutaneous three times a day before meals  metoclopramide 5 milliGRAM(s) Oral every 12 hours  sodium chloride 0.9%. 1000 milliLiter(s) (125 mL/Hr) IV Continuous <Continuous>    MEDICATIONS  (PRN):  dextrose Gel 1 Dose(s) Oral once PRN Blood Glucose LESS THAN 70 milliGRAM(s)/deciliter  glucagon  Injectable 1 milliGRAM(s) IntraMuscular once PRN Glucose LESS THAN 70 milligrams/deciliter  ondansetron Injectable 4 milliGRAM(s) IV Push every 8 hours PRN Nausea and/or Vomiting      FAMILY HISTORY:  nc    SOCIAL HISTORY:  as above    REVIEW OF SYSTEMS:  CONSTITUTIONAL: No fever, weight loss, or fatigue  EYES: No eye pain, visual disturbances, or discharge  ENT:  No difficulty hearing, tinnitus, vertigo; No sinus or throat pain  NECK: No pain or stiffness  RESPIRATORY: No cough, wheezing, chills or hemoptysis; No Shortness of Breath  CARDIOVASCULAR: No chest pain, palpitations, passing out, dizziness, or leg swelling  GASTROINTESTINAL: No abdominal or epigastric pain. No nausea, vomiting, or hematemesis; No diarrhea or constipation. No melena or hematochezia.  GENITOURINARY: No dysuria, frequency, hematuria, or incontinence  NEUROLOGICAL: No headaches, memory loss, loss of strength, numbness, or tremors  SKIN: No itching, burning, rashes, or lesions   LYMPH Nodes: No enlarged glands  ENDOCRINE: No heat or cold intolerance; No hair loss  MUSCULOSKELETAL: No joint pain or swelling; No muscle, back, or extremity pain  PSYCHIATRIC: No depression, anxiety, mood swings, or difficulty sleeping  HEME/LYMPH: No easy bruising, or bleeding gums  ALLERGY AND IMMUNOLOGIC: No hives or eczema	        Vital Signs Last 24 Hrs  T(C): 36.2 (2017 15:31), Max: 37.2 (2017 23:54)  T(F): 97.2 (2017 15:31), Max: 98.9 (2017 23:54)  HR: 54 (2017 15:31) (54 - 70)  BP: 149/82 (2017 15:31) (139/82 - 149/82)  BP(mean): --  RR: 16 (2017 15:31) (16 - 18)  SpO2: 100% (2017 15:31) (100% - 100%)      Constitutional:    HEENT: nad    Neck:  No JVD, bruits or thyromegaly    Respiratory:  Clear without rales or rhonchi    Cardiovascular:  RR without murmur, rub or gallop.    Gastrointestinal: Soft without hepatosplenomegaly.    Extremities: without cyanosis, clubbing or edema.    Neurological:  Oriented   x  3    . No gross sensory or motor defects.        LABS:                        13.5   11.9  )-----------( 160      ( 2017 07:07 )             40.9     11    144  |  112<H>  |  18  ----------------------------<  176<H>  3.6   |  23  |  1.66<H>    Ca    8.6      2017 07:07    TPro  6.6  /  Alb  3.6  /  TBili  1.3<H>  /  DBili  x   /  AST  25  /  ALT  42  /  AlkPhos  148<H>  11          Urinalysis Basic - ( 2017 09:08 )    Color: Yellow / Appearance: Clear / S.005 / pH: x  Gluc: x / Ketone: Small  / Bili: Negative / Urobili: Negative   Blood: x / Protein: 30 mg/dL / Nitrite: Negative   Leuk Esterase: Negative / RBC: 0-2 /HPF / WBC 0-2 /HPF   Sq Epi: x / Non Sq Epi: Occasional /HPF / Bacteria: x      CAPILLARY BLOOD GLUCOSE  214 (2017 02:15)      POCT Blood Glucose.: 82 mg/dL (2017 15:28)  POCT Blood Glucose.: 141 mg/dL (2017 11:25)  POCT Blood Glucose.: 176 mg/dL (2017 06:22)  POCT Blood Glucose.: 214 mg/dL (2017 02:09)  POCT Blood Glucose.: 263 mg/dL (2017 22:04)      RADIOLOGY & ADDITIONAL STUDIES:

## 2017-11-02 NOTE — DISCHARGE NOTE ADULT - CARE PLAN
Principal Discharge DX:	Uncontrolled type 1 diabetes mellitus with hyperosmolarity without coma  Goal:	keep HbA1C<6.5  Instructions for follow-up, activity and diet:	Please follow up with primary care doctor and endocrinologist within 1 week. Continue to use lantus and humaLog. Closely monitor finger stick sugar 4 times a day.  Secondary Diagnosis:	Renal insufficiency  Instructions for follow-up, activity and diet:	Please follow up with primary care doctor  within 1 week and repeat BMP, monitor Creatine level. Principal Discharge DX:	Uncontrolled type 1 diabetes mellitus with hyperosmolarity without coma  Goal:	keep HbA1C<6.5  Instructions for follow-up, activity and diet:	Please follow up with primary care doctor and endocrinologist within 1 week. Continue to use lantus and humaLog. Closely monitor finger stick sugar 4 times a day.  Secondary Diagnosis:	Renal insufficiency  Instructions for follow-up, activity and diet:	Please follow up with primary care doctor  within 1 week and repeat BMP to monitor Creatine level. Please avoid NSAIDs, nephrotoxic medications, Contrast etc. Principal Discharge DX:	Uncontrolled type 1 diabetes mellitus with hyperosmolarity without coma  Goal:	keep HbA1C<6.5  Instructions for follow-up, activity and diet:	Please follow up with primary care doctor and endocrinologist within 1 week. Continue to use lantus 23units at bedtime and humaLog 6 units before meals, 3 times a day. Closely monitor finger stick sugar 4 times a day.  Secondary Diagnosis:	Renal insufficiency  Instructions for follow-up, activity and diet:	Please follow up with primary care doctor  within 1 week and repeat BMP to monitor Creatine level. Please avoid NSAIDs, nephrotoxic medications, Contrast etc. Principal Discharge DX:	Uncontrolled type 1 diabetes mellitus with hyperosmolarity without coma  Goal:	keep HbA1C<6.5  Instructions for follow-up, activity and diet:	Please follow up with primary care doctor and endocrinologist  within one week. Continue to use Lantus 23units at bedtime and HumaLog 6 units before meals, 3 times a day. Closely monitor finger stick sugar 4 times a day.  Secondary Diagnosis:	Renal insufficiency  Instructions for follow-up, activity and diet:	Please follow up with primary care doctor  within 1 week and repeat BMP to monitor creatine level. Please avoid NSAIDs, nephrotoxic medications and contrast.

## 2017-11-02 NOTE — DISCHARGE NOTE ADULT - PATIENT PORTAL LINK FT
“You can access the Follow[employer] Patient Portal, offered by [facility], by registering with the following website: [email or web address]”

## 2017-11-02 NOTE — CONSULT NOTE ADULT - PROBLEM SELECTOR RECOMMENDATION 9
un cont due to non compliance with insulin, fsg and md visits  d/w pt need for better control  possible insulin pump in future for better control and compliance   cont current insulin tx  fsg ac and hs  f/u as out pt  d/w hs

## 2017-11-02 NOTE — DISCHARGE NOTE ADULT - CARE PROVIDER_API CALL
Angela Delcid (RADHA), EndocrinologyMetabDiabetes  [address] [place] [address]  Phone: [phone]  Fax: [phone]

## 2017-11-02 NOTE — PROGRESS NOTE ADULT - PROBLEM SELECTOR PLAN 3
OLGA with possible underlying  CKD  - BUN/Cr< 20  - Baseline creatinine unknown  - s/p 2L NS  -Cr improving   - repeat BMP   - f/u urine studies  - Consider renal US  - Avoid NSAIDs, nephrotoxic medications, Contrast etc

## 2017-11-02 NOTE — PROGRESS NOTE ADULT - SUBJECTIVE AND OBJECTIVE BOX
PGY1 Note discussed with supervising resident and primary attending.    Patient is a 31y old  Male who presents with a chief complaint of polyuria, nausea, vomiting, and diarrhea (2017 12:33)      INTERVAL HPI/OVERNIGHT EVENTS: Patient was seen and examined at bedside. Pt complains gum swelling on left upper side, but no tenderness. No n/v/d/c.  this am, will keep monitor FS ac/hs. Pt has no other overnight complains .     MEDICATIONS  (STANDING):  dextrose 5%. 1000 milliLiter(s) (50 mL/Hr) IV Continuous <Continuous>  dextrose 50% Injectable 12.5 Gram(s) IV Push once  dextrose 50% Injectable 25 Gram(s) IV Push once  dextrose 50% Injectable 25 Gram(s) IV Push once  insulin glargine Injectable (LANTUS) 23 Unit(s) SubCutaneous at bedtime  insulin lispro (HumaLOG) corrective regimen sliding scale   SubCutaneous Before meals and at bedtime  insulin lispro Injectable (HumaLOG) 6 Unit(s) SubCutaneous three times a day before meals  metoclopramide 5 milliGRAM(s) Oral every 12 hours  sodium chloride 0.9%. 1000 milliLiter(s) (125 mL/Hr) IV Continuous <Continuous>    MEDICATIONS  (PRN):  dextrose Gel 1 Dose(s) Oral once PRN Blood Glucose LESS THAN 70 milliGRAM(s)/deciliter  glucagon  Injectable 1 milliGRAM(s) IntraMuscular once PRN Glucose LESS THAN 70 milligrams/deciliter  ondansetron Injectable 4 milliGRAM(s) IV Push every 8 hours PRN Nausea and/or Vomiting      Allergies    No Known Allergies    Intolerances        REVIEW OF SYSTEMS:  CONSTITUTIONAL: No fever, weight loss, or fatigue  RESPIRATORY: No cough, wheezing, chills or hemoptysis; No shortness of breath  CARDIOVASCULAR: No chest pain, palpitations, dizziness, or leg swelling  GASTROINTESTINAL: No abdominal or epigastric pain. No nausea, vomiting, or hematemesis; No diarrhea or constipation. No melena or hematochezia.  NEUROLOGICAL: No headaches, memory loss, loss of strength, numbness, or tremors  SKIN: No itching, burning, rashes, or lesions     Vital Signs Last 24 Hrs  T(C): 36.9 (2017 07:34), Max: 37.2 (2017 23:54)  T(F): 98.4 (2017 07:34), Max: 98.9 (2017 23:54)  HR: 70 (2017 07:34) (59 - 79)  BP: 139/82 (2017 07:34) (139/82 - 157/83)  BP(mean): --  RR: 16 (2017 07:34) (16 - 19)  SpO2: 100% (2017 07:34) (99% - 100%)    PHYSICAL EXAM:  GENERAL: NAD, well-groomed, well-developed  HEAD:  Atraumatic, Normocephalic  EYES: EOMI, PERRLA, conjunctiva and sclera clear  NECK: Supple, No JVD, Normal thyroid  CHEST/LUNG: Clear to percussion bilaterally; No rales, rhonchi, wheezing, or rubs  HEART: Regular rate and rhythm; No murmurs, rubs, or gallops  ABDOMEN: Soft, Nontender, Nondistended; Bowel sounds present  NERVOUS SYSTEM:  Alert & Oriented X3, Good concentration; Motor Strength 5/5 B/L   EXTREMITIES:  2+ Peripheral Pulses, No clubbing, cyanosis, or edema  SKIN;    LABS:                        13.5   11.9  )-----------( 160      ( 2017 07:07 )             40.9     11-02    144  |  112<H>  |  18  ----------------------------<  176<H>  3.6   |  23  |  1.66<H>    Ca    8.6      2017 07:07    TPro  6.6  /  Alb  3.6  /  TBili  1.3<H>  /  DBili  x   /  AST  25  /  ALT  42  /  AlkPhos  148<H>  11-01      Urinalysis Basic - ( 2017 09:08 )    Color: Yellow / Appearance: Clear / S.005 / pH: x  Gluc: x / Ketone: Small  / Bili: Negative / Urobili: Negative   Blood: x / Protein: 30 mg/dL / Nitrite: Negative   Leuk Esterase: Negative / RBC: 0-2 /HPF / WBC 0-2 /HPF   Sq Epi: x / Non Sq Epi: Occasional /HPF / Bacteria: x      CAPILLARY BLOOD GLUCOSE  214 (2017 02:15)      POCT Blood Glucose.: 176 mg/dL (2017 06:22)  POCT Blood Glucose.: 214 mg/dL (2017 02:09)  POCT Blood Glucose.: 263 mg/dL (2017 22:04)  POCT Blood Glucose.: 352 mg/dL (2017 15:42)  POCT Blood Glucose.: 262 mg/dL (2017 11:58)  POCT Blood Glucose.: 293 mg/dL (2017 10:59)  POCT Blood Glucose.: 377 mg/dL (2017 09:55)      RADIOLOGY & ADDITIONAL TESTS:    Imaging Personally Reviewed:  [x ] YES  [ ] NO    Consultant(s) Notes Reviewed:  [x ] YES  [ ] NO

## 2017-11-02 NOTE — DISCHARGE NOTE ADULT - MEDICATION SUMMARY - MEDICATIONS TO CHANGE
I will SWITCH the dose or number of times a day I take the medications listed below when I get home from the hospital:    Lantus 100 units/mL subcutaneous solution  -- 30  subcutaneous once a day    HumaLOG 100 units/mL subcutaneous solution  -- 8 unit(s) subcutaneous 3 times a day

## 2017-11-02 NOTE — DISCHARGE NOTE ADULT - HOSPITAL COURSE
Patient is a 31 year old male with PMH type 1 DM came in due to polyuria for 1 day. He said he did not take his insulin yesterday and was not feeling well. He knew his blood glucose is high  because he had to urinate multiple times 2 days ago. Yesterday morning  he had nausea, and persistent vomiting and diarrhea with mild abdominal pain so he decided to come to the ER. Pt was admitted as uncontrolled type 1 DM with hyperosmolar state.On admission,blood glucose>600,anion gap normal, most likely secondary to medication noncompliance, status post 2L NS bolus and started Lantus 20U at bedtime,  Humalog 5U premeals tid, and insulin sliding scale q4hr , Reglan and zofran given for nausea and vomiting, Endocrinologist  consulted.  Patient also has acute renal injury, Creatine improving after IV hydration. Patient smokes weed, urine toxicology THC and cocaine positive.  consulted for drug abuse and education.    Patient was seen and examined at bedside, vitals stable. Patient will be discharged to home today. Discussed with  and she agreed with discharge plan. Patient is a 31 year old male with PMH type 1 DM came in due to polyuria for 1 day. He said he did not take his insulin yesterday and was not feeling well. He knew his blood glucose is high  because he had to urinate multiple times 2 days ago. Yesterday morning  he had nausea, and persistent vomiting and diarrhea with mild abdominal pain so he decided to come to the ER. Pt was admitted as uncontrolled type 1 DM with hyperosmolar state.On admission,blood glucose>600,anion gap normal, most likely secondary to medication noncompliance, status post 2L normal sailne bolus and started on Lantus 20U at bedtime,  Humalog 5U premeals tid, and insulin sliding scale q4hr , Reglan and zofran given for nausea and vomiting, Endocrinologist  consulted.  Patient also has acute renal injury, Creatine improving after IV hydration. Patient smokes weed, urine toxicology THC and cocaine positive.  consulted for drug abuse and education.    Patient was seen and examined at bedside, vitals stable. Patient will be discharged to home today. Discussed with  and she agreed with discharge plan. Patient is a 31 year old male with PMH type 1 DM came in due to polyuria for 1 day. He said he did not take his insulin yesterday and was not feeling well. He knew his blood glucose is high  because he had to urinate multiple times 2 days ago. Yesterday morning  he had nausea, and persistent vomiting and diarrhea with mild abdominal pain so he decided to come to the ER. Pt was admitted as uncontrolled type 1 DM with hyperosmolar state.On admission,blood glucose>600,anion gap normal, most likely secondary to medication noncompliance, status post 2L normal sailne bolus and continue on Lantus 23U at bedtime,  Humalog 6U premeals tid, and insulin sliding scale q4hr , Reglan and zofran given for nausea and vomiting, Endocrinologist  consulted.  Patient also has acute renal injury, Creatine improving after IV hydration. Patient smokes weed, urine toxicology THC and cocaine positive.  consulted for drug abuse and education.    Patient was seen and examined at bedside, vitals stable. Patient will be discharged to home today. Discussed with  and she agreed with discharge plan. Patient is a 31 year old male with PMH type 1 DM came in due to polyuria for one day. He said he did not take his insulin yesterday and was not feeling well. He knew his blood glucose is high  because he had to urinate multiple times 2 days ago. Yesterday morning  he had nausea, and persistent vomiting and diarrhea with mild abdominal pain so he decided to come to the ER. Patient was admitted as uncontrolled type 1 DM with hyperosmolar state.On admission,blood glucose>600,anion gap normal, most likely secondary to medication noncompliance, status post 2 liters normal saline bolus and continue on Lantus 23 units at bedtime,  Humalog 6 units premeals three times a day, and insulin sliding scale q4hr , Reglan and zofran given for nausea and vomiting, Endocrinologist  consulted. Will continue on Lantus 23 units at bedtime,  Humalog 6 units premeals three times a day after discharge.  Patient also has acute renal injury, Creatine improving after IV fluid hydration. Patient smokes weed, urine toxicology THC and cocaine positive.  consulted for drug abuse and education.    Patient was seen and examined at bedside, vitals stable. Patient will be discharged to home today. Discussed with  and she agreed with discharge plan.

## 2017-11-02 NOTE — DISCHARGE NOTE ADULT - PLAN OF CARE
keep HbA1C<6.5 Please follow up with primary care doctor and endocrinologist within 1 week. Continue to use lantus and humaLog. Closely monitor finger stick sugar 4 times a day. Please follow up with primary care doctor  within 1 week and repeat BMP, monitor Creatine level. Please follow up with primary care doctor  within 1 week and repeat BMP to monitor Creatine level. Please avoid NSAIDs, nephrotoxic medications, Contrast etc. Please follow up with primary care doctor and endocrinologist within 1 week. Continue to use lantus 23units at bedtime and humaLog 6 units before meals, 3 times a day. Closely monitor finger stick sugar 4 times a day. Please follow up with primary care doctor and endocrinologist  within one week. Continue to use Lantus 23units at bedtime and HumaLog 6 units before meals, 3 times a day. Closely monitor finger stick sugar 4 times a day. Please follow up with primary care doctor  within 1 week and repeat BMP to monitor creatine level. Please avoid NSAIDs, nephrotoxic medications and contrast.

## 2017-11-02 NOTE — PROGRESS NOTE ADULT - ASSESSMENT
30yo M with h/o type 1 DM came in due to polyuria x 1 day. He said he did not take his insulin yesterday and was not feeling well. He knew his blood glucose is high  because he had to urinate multiple times 2 days ago. Yesterday morning around 7am he had nausea, and persistent vomiting and diarrhea with mild abdominal pain so he decided to come to the ER. He denies confusion, lethargy or disorientation. He denies any more episodes of vomiting or diarrhea and said he feels better now. Patient is noncompliant with his insulin. He said he "ran out of insulin" and then said he just didn't feel like taking out. He also states not being compliant with carbohydrate consistent diet lately. He does not have a PCP. The last time he saw a physician was more then a year ago. Also states requiring insulin drip for hyperglycemia about 6 years ago. 30yo M with h/o type 1 DM came in due to polyuria x 1 day. He said he did not take his insulin yesterday and was not feeling well. He knew his blood glucose is high  because he had to urinate multiple times 2 days ago. Yesterday morning around 7am he had nausea, and persistent vomiting and diarrhea with mild abdominal pain so he decided to come to the ER. Pt was admitted as uncontrolled type 1 DM with hyperosmolar state.

## 2017-11-02 NOTE — PROGRESS NOTE ADULT - ATTENDING COMMENTS
LATE ENTRY NOTE:  Patient was seen with PGY 1 Dr Zarate on Thursday Nov 2nd at about 1 pm- he was clinically improved with much better controlled blood glucose. He was seen in consultation by the endocrinologist  32yo M with h/o T1DM admitted for uncontrolled diabetes likely due to non compliance with insulin.  - clinically improved with insulin therapy and IV fluids.  Diabetic education and need for compliance discussed in details.   patient was discharged in stable medical condition and advised to follow up with PCP and endocrinologist.  Spent 34 minutes independently with patient and to coordinate discharge plans.

## 2018-04-20 NOTE — DISCHARGE NOTE ADULT - NS DC ANGIO PCI YN
----- Message from Michael Childress MD sent at 4/20/2018 10:23 AM CDT -----  Needs to see PCP    Michael Childress MD  
Pt has been informed to see her PCP for abnormal TSH level.  Pt voiced understanding. jose  
no

## 2020-09-04 NOTE — PATIENT PROFILE ADULT. - FALL HARM RISK CONCLUSION
Discharge Diagnosis:Lumbar spondylosis [M47.816]  Condition on Discharge: Stable.  Diet on Discharge: Same as before.  Activity: as per instruction sheet.  Discharge to: Home with a responsible adult.  Follow up: as per Discharge instructions      
Fall Risk

## 2021-08-03 NOTE — ED ADULT TRIAGE NOTE - MEANS OF ARRIVAL
When writer came on to shift day shift RN told writer heparin gtt was running at 14 units/kg/hr and that the next aptt was due to be drawn at 2100. It also showed on the nurse hand-off print off that the heparin was running at 14 units/kg/hr. Upon further investigation the writer noticed that it was charted in the [facility] running at 12 units/kg/hr when the gtt was started, but the pump was running at 14 units/kg/hr. Because it printed in the nurse hand-off the the gtt was running at 14 units/kg/hr writer found where the day shift RN documented the appropriate dose/rate change for the corresponding aptt. The dose/rate change was documented in the intake/outputat 1702  and did not transfer over to the [facility] (see flowsheet.) Writer documented at the beginning of shift what heparin gtt was running at in the [facility] an placed an order for the next aptt to be drawn at the appropriate time. Will continue to monitor. 46 J/stretcher

## 2021-12-22 ENCOUNTER — EMERGENCY (EMERGENCY)
Facility: HOSPITAL | Age: 36
LOS: 1 days | Discharge: HOME | End: 2021-12-22
Attending: STUDENT IN AN ORGANIZED HEALTH CARE EDUCATION/TRAINING PROGRAM
Payer: MEDICAID

## 2021-12-22 VITALS
RESPIRATION RATE: 17 BRPM | OXYGEN SATURATION: 98 % | HEART RATE: 70 BPM | SYSTOLIC BLOOD PRESSURE: 124 MMHG | TEMPERATURE: 100 F | DIASTOLIC BLOOD PRESSURE: 68 MMHG

## 2021-12-22 VITALS
RESPIRATION RATE: 18 BRPM | HEIGHT: 68 IN | OXYGEN SATURATION: 99 % | WEIGHT: 149.91 LBS | TEMPERATURE: 98 F | SYSTOLIC BLOOD PRESSURE: 171 MMHG | HEART RATE: 59 BPM | DIASTOLIC BLOOD PRESSURE: 83 MMHG

## 2021-12-22 PROBLEM — E13.10 OTHER SPECIFIED DIABETES MELLITUS WITH KETOACIDOSIS WITHOUT COMA: Chronic | Status: ACTIVE | Noted: 2017-11-01

## 2021-12-22 PROBLEM — E11.9 TYPE 2 DIABETES MELLITUS WITHOUT COMPLICATIONS: Chronic | Status: ACTIVE | Noted: 2017-11-01

## 2021-12-22 LAB
ALBUMIN SERPL ELPH-MCNC: 3.8 G/DL — SIGNIFICANT CHANGE UP (ref 3.5–5)
ALP SERPL-CCNC: 109 U/L — SIGNIFICANT CHANGE UP (ref 40–120)
ALT FLD-CCNC: 23 U/L DA — SIGNIFICANT CHANGE UP (ref 10–60)
ANION GAP SERPL CALC-SCNC: 7 MMOL/L — SIGNIFICANT CHANGE UP (ref 5–17)
ANION GAP SERPL CALC-SCNC: 8 MMOL/L — SIGNIFICANT CHANGE UP (ref 5–17)
ANISOCYTOSIS BLD QL: SLIGHT — SIGNIFICANT CHANGE UP
APPEARANCE UR: CLEAR — SIGNIFICANT CHANGE UP
AST SERPL-CCNC: 26 U/L — SIGNIFICANT CHANGE UP (ref 10–40)
BACTERIA # UR AUTO: ABNORMAL /HPF
BASOPHILS # BLD AUTO: 0.03 K/UL — SIGNIFICANT CHANGE UP (ref 0–0.2)
BASOPHILS NFR BLD AUTO: 0.3 % — SIGNIFICANT CHANGE UP (ref 0–2)
BILIRUB SERPL-MCNC: 1 MG/DL — SIGNIFICANT CHANGE UP (ref 0.2–1.2)
BILIRUB UR-MCNC: NEGATIVE — SIGNIFICANT CHANGE UP
BUN SERPL-MCNC: 11 MG/DL — SIGNIFICANT CHANGE UP (ref 7–18)
BUN SERPL-MCNC: 11 MG/DL — SIGNIFICANT CHANGE UP (ref 7–18)
CALCIUM SERPL-MCNC: 9 MG/DL — SIGNIFICANT CHANGE UP (ref 8.4–10.5)
CALCIUM SERPL-MCNC: 9 MG/DL — SIGNIFICANT CHANGE UP (ref 8.4–10.5)
CHLORIDE SERPL-SCNC: 109 MMOL/L — HIGH (ref 96–108)
CHLORIDE SERPL-SCNC: 111 MMOL/L — HIGH (ref 96–108)
CO2 SERPL-SCNC: 24 MMOL/L — SIGNIFICANT CHANGE UP (ref 22–31)
CO2 SERPL-SCNC: 25 MMOL/L — SIGNIFICANT CHANGE UP (ref 22–31)
COLOR SPEC: YELLOW — SIGNIFICANT CHANGE UP
CREAT SERPL-MCNC: 1.35 MG/DL — HIGH (ref 0.5–1.3)
CREAT SERPL-MCNC: 1.59 MG/DL — HIGH (ref 0.5–1.3)
DIFF PNL FLD: ABNORMAL
EOSINOPHIL # BLD AUTO: 0.01 K/UL — SIGNIFICANT CHANGE UP (ref 0–0.5)
EOSINOPHIL NFR BLD AUTO: 0.1 % — SIGNIFICANT CHANGE UP (ref 0–6)
EPI CELLS # UR: SIGNIFICANT CHANGE UP /HPF
GIANT PLATELETS BLD QL SMEAR: PRESENT — SIGNIFICANT CHANGE UP
GLUCOSE SERPL-MCNC: 31 MG/DL — CRITICAL LOW (ref 70–99)
GLUCOSE SERPL-MCNC: 84 MG/DL — SIGNIFICANT CHANGE UP (ref 70–99)
GLUCOSE UR QL: NEGATIVE — SIGNIFICANT CHANGE UP
HCT VFR BLD CALC: 41.5 % — SIGNIFICANT CHANGE UP (ref 39–50)
HGB BLD-MCNC: 13.8 G/DL — SIGNIFICANT CHANGE UP (ref 13–17)
HYPOCHROMIA BLD QL: SLIGHT — SIGNIFICANT CHANGE UP
IMM GRANULOCYTES NFR BLD AUTO: 0.5 % — SIGNIFICANT CHANGE UP (ref 0–1.5)
KETONES UR-MCNC: ABNORMAL
LACTATE SERPL-SCNC: 1 MMOL/L — SIGNIFICANT CHANGE UP (ref 0.7–2)
LACTATE SERPL-SCNC: 2.1 MMOL/L — HIGH (ref 0.7–2)
LEUKOCYTE ESTERASE UR-ACNC: NEGATIVE — SIGNIFICANT CHANGE UP
LG PLATELETS BLD QL AUTO: SLIGHT — SIGNIFICANT CHANGE UP
LIDOCAIN IGE QN: 78 U/L — SIGNIFICANT CHANGE UP (ref 73–393)
LYMPHOCYTES # BLD AUTO: 0.6 K/UL — LOW (ref 1–3.3)
LYMPHOCYTES # BLD AUTO: 5.9 % — LOW (ref 13–44)
MAGNESIUM SERPL-MCNC: 1.6 MG/DL — SIGNIFICANT CHANGE UP (ref 1.6–2.6)
MANUAL SMEAR VERIFICATION: SIGNIFICANT CHANGE UP
MCHC RBC-ENTMCNC: 26.4 PG — LOW (ref 27–34)
MCHC RBC-ENTMCNC: 33.3 GM/DL — SIGNIFICANT CHANGE UP (ref 32–36)
MCV RBC AUTO: 79.3 FL — LOW (ref 80–100)
MICROCYTES BLD QL: SLIGHT — SIGNIFICANT CHANGE UP
MONOCYTES # BLD AUTO: 0.32 K/UL — SIGNIFICANT CHANGE UP (ref 0–0.9)
MONOCYTES NFR BLD AUTO: 3.2 % — SIGNIFICANT CHANGE UP (ref 2–14)
NEUTROPHILS # BLD AUTO: 9.11 K/UL — HIGH (ref 1.8–7.4)
NEUTROPHILS NFR BLD AUTO: 90 % — HIGH (ref 43–77)
NITRITE UR-MCNC: NEGATIVE — SIGNIFICANT CHANGE UP
NRBC # BLD: 0 /100 WBCS — SIGNIFICANT CHANGE UP (ref 0–0)
PH UR: 8 — SIGNIFICANT CHANGE UP (ref 5–8)
PLAT MORPH BLD: NORMAL — SIGNIFICANT CHANGE UP
PLATELET # BLD AUTO: 164 K/UL — SIGNIFICANT CHANGE UP (ref 150–400)
PLATELET CLUMP BLD QL SMEAR: SLIGHT
POIKILOCYTOSIS BLD QL AUTO: SLIGHT — SIGNIFICANT CHANGE UP
POTASSIUM SERPL-MCNC: 3.4 MMOL/L — LOW (ref 3.5–5.3)
POTASSIUM SERPL-MCNC: 3.5 MMOL/L — SIGNIFICANT CHANGE UP (ref 3.5–5.3)
POTASSIUM SERPL-SCNC: 3.4 MMOL/L — LOW (ref 3.5–5.3)
POTASSIUM SERPL-SCNC: 3.5 MMOL/L — SIGNIFICANT CHANGE UP (ref 3.5–5.3)
PROT SERPL-MCNC: 7.9 G/DL — SIGNIFICANT CHANGE UP (ref 6–8.3)
PROT UR-MCNC: 100
RBC # BLD: 5.23 M/UL — SIGNIFICANT CHANGE UP (ref 4.2–5.8)
RBC # FLD: 15.5 % — HIGH (ref 10.3–14.5)
RBC BLD AUTO: ABNORMAL
RBC CASTS # UR COMP ASSIST: ABNORMAL /HPF (ref 0–2)
SCHISTOCYTES BLD QL AUTO: SLIGHT — SIGNIFICANT CHANGE UP
SODIUM SERPL-SCNC: 142 MMOL/L — SIGNIFICANT CHANGE UP (ref 135–145)
SODIUM SERPL-SCNC: 142 MMOL/L — SIGNIFICANT CHANGE UP (ref 135–145)
SP GR SPEC: 1.01 — SIGNIFICANT CHANGE UP (ref 1.01–1.02)
SPHEROCYTES BLD QL SMEAR: SLIGHT — SIGNIFICANT CHANGE UP
UROBILINOGEN FLD QL: NEGATIVE — SIGNIFICANT CHANGE UP
WBC # BLD: 10.12 K/UL — SIGNIFICANT CHANGE UP (ref 3.8–10.5)
WBC # FLD AUTO: 10.12 K/UL — SIGNIFICANT CHANGE UP (ref 3.8–10.5)
WBC UR QL: SIGNIFICANT CHANGE UP /HPF (ref 0–5)

## 2021-12-22 PROCEDURE — 99284 EMERGENCY DEPT VISIT MOD MDM: CPT | Mod: 25

## 2021-12-22 PROCEDURE — 83605 ASSAY OF LACTIC ACID: CPT

## 2021-12-22 PROCEDURE — 80048 BASIC METABOLIC PNL TOTAL CA: CPT

## 2021-12-22 PROCEDURE — 83690 ASSAY OF LIPASE: CPT

## 2021-12-22 PROCEDURE — 36415 COLL VENOUS BLD VENIPUNCTURE: CPT

## 2021-12-22 PROCEDURE — 99284 EMERGENCY DEPT VISIT MOD MDM: CPT

## 2021-12-22 PROCEDURE — 96374 THER/PROPH/DIAG INJ IV PUSH: CPT

## 2021-12-22 PROCEDURE — 83735 ASSAY OF MAGNESIUM: CPT

## 2021-12-22 PROCEDURE — 85025 COMPLETE CBC W/AUTO DIFF WBC: CPT

## 2021-12-22 PROCEDURE — 80053 COMPREHEN METABOLIC PANEL: CPT

## 2021-12-22 PROCEDURE — 82962 GLUCOSE BLOOD TEST: CPT

## 2021-12-22 PROCEDURE — 81001 URINALYSIS AUTO W/SCOPE: CPT

## 2021-12-22 PROCEDURE — 96375 TX/PRO/DX INJ NEW DRUG ADDON: CPT

## 2021-12-22 PROCEDURE — 87086 URINE CULTURE/COLONY COUNT: CPT

## 2021-12-22 RX ORDER — DEXTROSE 50 % IN WATER 50 %
50 SYRINGE (ML) INTRAVENOUS ONCE
Refills: 0 | Status: COMPLETED | OUTPATIENT
Start: 2021-12-22 | End: 2021-12-22

## 2021-12-22 RX ORDER — SODIUM CHLORIDE 9 MG/ML
1000 INJECTION INTRAMUSCULAR; INTRAVENOUS; SUBCUTANEOUS ONCE
Refills: 0 | Status: COMPLETED | OUTPATIENT
Start: 2021-12-22 | End: 2021-12-22

## 2021-12-22 RX ORDER — ONDANSETRON 8 MG/1
4 TABLET, FILM COATED ORAL ONCE
Refills: 0 | Status: COMPLETED | OUTPATIENT
Start: 2021-12-22 | End: 2021-12-22

## 2021-12-22 RX ADMIN — SODIUM CHLORIDE 1000 MILLILITER(S): 9 INJECTION INTRAMUSCULAR; INTRAVENOUS; SUBCUTANEOUS at 06:21

## 2021-12-22 RX ADMIN — Medication 50 MILLILITER(S): at 09:59

## 2021-12-22 RX ADMIN — SODIUM CHLORIDE 1000 MILLILITER(S): 9 INJECTION INTRAMUSCULAR; INTRAVENOUS; SUBCUTANEOUS at 09:39

## 2021-12-22 RX ADMIN — ONDANSETRON 4 MILLIGRAM(S): 8 TABLET, FILM COATED ORAL at 06:22

## 2021-12-22 NOTE — ED PROVIDER NOTE - PATIENT PORTAL LINK FT
You can access the FollowMyHealth Patient Portal offered by [facility] by registering at the following website: [email or web address]. By joining [employer]’s FollowMyHealth portal, you will also be able to view your health information using other applications (apps) compatible with our system.

## 2021-12-22 NOTE — ED PROVIDER NOTE - CONSTITUTIONAL, MLM
normal... vomiting, awake, alert, oriented to person, place, time/situation and in no apparent distress.

## 2021-12-22 NOTE — ED PROVIDER NOTE - OBJECTIVE STATEMENT
36-year-old male hx of IDDM, daily marijuana use presenting with nausea/vomiting since earlier tonight. Multiple episodes of NBNB emesis. No abdominal pain. No fevers or chills. No hx of abdominal surgeries. No other symptoms.

## 2021-12-22 NOTE — ED ADULT NURSE NOTE - PRIMARY CARE PROVIDER
Please give pt a call he wants a ultrasound he states the issue was discussed in his last office visit   not known

## 2021-12-22 NOTE — ED PROVIDER NOTE - CLINICAL SUMMARY MEDICAL DECISION MAKING FREE TEXT BOX
36-year-old male hx of IDDM, daily marijuana use presenting with nausea/vomiting since earlier tonight. Labs with elevated lactate and slight elevation in creatinine, possibly 2/2 dehydration. Zofran/IVF written. Signed out to day team.

## 2021-12-22 NOTE — ED PROVIDER NOTE - NSFOLLOWUPINSTRUCTIONS_ED_ALL_ED_FT
Nausea and Vomiting, Adult      Nausea is the feeling that you have an upset stomach or that you are about to vomit. Vomiting is when stomach contents are thrown up and out of the mouth as a result of nausea. Vomiting can make you feel weak and cause you to become dehydrated.    Dehydration can make you feel tired and thirsty, cause you to have a dry mouth, and decrease how often you urinate. Older adults and people with other diseases or a weak disease-fighting system (immune system) are at higher risk for dehydration. It is important to treat your nausea and vomiting as told by your health care provider.      Follow these instructions at home:    Watch your symptoms for any changes. Tell your health care provider about them. Follow these instructions to care for yourself at home.      Eating and drinking                   •Take an oral rehydration solution (ORS). This is a drink that is sold at pharmacies and retail stores.      •Drink clear fluids slowly and in small amounts as you are able. Clear fluids include water, ice chips, low-calorie sports drinks, and fruit juice that has water added (diluted fruit juice).      •Eat bland, easy-to-digest foods in small amounts as you are able. These foods include bananas, applesauce, rice, lean meats, toast, and crackers.      •Avoid fluids that contain a lot of sugar or caffeine, such as energy drinks, sports drinks, and soda.      •Avoid alcohol.      •Avoid spicy or fatty foods.      General instructions     •Take over-the-counter and prescription medicines only as told by your health care provider.      •Drink enough fluid to keep your urine pale yellow.      •Wash your hands often using soap and water. If soap and water are not available, use hand .      •Make sure that all people in your household wash their hands well and often.      •Rest at home while you recover.      •Watch your condition for any changes.      •Breathe slowly and deeply when you feel nauseated.      •Keep all follow-up visits as told by your health care provider. This is important.        Contact a health care provider if:    •Your symptoms get worse.      •You have new symptoms.      •You have a fever.      •You cannot drink fluids without vomiting.      •Your nausea does not go away after 2 days.      •You feel light-headed or dizzy.      •You have a headache.      •You have muscle cramps.      •You have a rash.      •You have pain while urinating.        Get help right away if:    •You have pain in your chest, neck, arm, or jaw.      •You feel extremely weak or you faint.      •You have persistent vomiting.      •You have vomit that is bright red or looks like black coffee grounds.      •You have bloody or black stools or stools that look like tar.      •You have a severe headache, a stiff neck, or both.      •You have severe pain, cramping, or bloating in your abdomen.      •You have difficulty breathing, or you are breathing very quickly.      •Your heart is beating very quickly.      •Your skin feels cold and clammy.      •You feel confused.    •You have signs of dehydration, such as:  •Dark urine, very little urine, or no urine.      •Cracked lips.      •Dry mouth.      •Sunken eyes.      •Sleepiness.      •Weakness.        These symptoms may represent a serious problem that is an emergency. Do not wait to see if the symptoms will go away. Get medical help right away. Call your local emergency services (911 in the [place] Do not drive yourself to the hospital.       Summary    •Nausea is the feeling that you have an upset stomach or that you are about to vomit. As nausea gets worse, it can lead to vomiting. Vomiting can make you feel weak and cause you to become dehydrated.      •Follow instructions from your health care provider about eating and drinking to prevent dehydration.      •Take over-the-counter and prescription medicines only as told by your health care provider.      •Contact your health care provider if your symptoms get worse, or you have new symptoms.      •Keep all follow-up visits as told by your health care provider. This is important.      This information is not intended to replace advice given to you by your health care provider. Make sure you discuss any questions you have with your health care provider.      Document Revised: 04/10/2020 Document Reviewed: 05/28/2019    Elsevier Patient Education © 2021 [employer]

## 2021-12-23 LAB
CULTURE RESULTS: SIGNIFICANT CHANGE UP
SPECIMEN SOURCE: SIGNIFICANT CHANGE UP

## 2022-01-07 NOTE — PATIENT PROFILE ADULT. - NS PRO CONTRA REFUSE FLU INFO
2:42 PM Recheck on patient. Discussed with patient ED findings and plan for discharge. Patient was given ED warnings, discharge instructions, and follow up information to go home with. Patient understands and agrees with plan for discharge. Any questions have been answered.    
Risks/benefits discussed with patient or patient surrogate

## 2022-06-22 ENCOUNTER — EMERGENCY (EMERGENCY)
Facility: HOSPITAL | Age: 37
LOS: 1 days | Discharge: HOME | End: 2022-06-22
Attending: EMERGENCY MEDICINE
Payer: MEDICAID

## 2022-06-22 VITALS
RESPIRATION RATE: 18 BRPM | DIASTOLIC BLOOD PRESSURE: 55 MMHG | SYSTOLIC BLOOD PRESSURE: 120 MMHG | TEMPERATURE: 98 F | OXYGEN SATURATION: 96 % | HEART RATE: 84 BPM

## 2022-06-22 VITALS
SYSTOLIC BLOOD PRESSURE: 157 MMHG | TEMPERATURE: 98 F | HEART RATE: 98 BPM | HEIGHT: 68 IN | DIASTOLIC BLOOD PRESSURE: 93 MMHG | RESPIRATION RATE: 17 BRPM

## 2022-06-22 LAB
ALBUMIN SERPL ELPH-MCNC: 3.8 G/DL — SIGNIFICANT CHANGE UP (ref 3.5–5)
ALP SERPL-CCNC: 106 U/L — SIGNIFICANT CHANGE UP (ref 40–120)
ALT FLD-CCNC: 20 U/L DA — SIGNIFICANT CHANGE UP (ref 10–60)
ANION GAP SERPL CALC-SCNC: 9 MMOL/L — SIGNIFICANT CHANGE UP (ref 5–17)
APPEARANCE UR: CLEAR — SIGNIFICANT CHANGE UP
AST SERPL-CCNC: 21 U/L — SIGNIFICANT CHANGE UP (ref 10–40)
BACTERIA # UR AUTO: ABNORMAL /HPF
BASOPHILS # BLD AUTO: 0.02 K/UL — SIGNIFICANT CHANGE UP (ref 0–0.2)
BASOPHILS NFR BLD AUTO: 0.3 % — SIGNIFICANT CHANGE UP (ref 0–2)
BILIRUB SERPL-MCNC: 1.4 MG/DL — HIGH (ref 0.2–1.2)
BILIRUB UR-MCNC: NEGATIVE — SIGNIFICANT CHANGE UP
BUN SERPL-MCNC: 14 MG/DL — SIGNIFICANT CHANGE UP (ref 7–18)
CALCIUM SERPL-MCNC: 9.4 MG/DL — SIGNIFICANT CHANGE UP (ref 8.4–10.5)
CHLORIDE SERPL-SCNC: 104 MMOL/L — SIGNIFICANT CHANGE UP (ref 96–108)
CO2 SERPL-SCNC: 25 MMOL/L — SIGNIFICANT CHANGE UP (ref 22–31)
COLOR SPEC: YELLOW — SIGNIFICANT CHANGE UP
CREAT SERPL-MCNC: 1.74 MG/DL — HIGH (ref 0.5–1.3)
DIFF PNL FLD: NEGATIVE — SIGNIFICANT CHANGE UP
EGFR: 51 ML/MIN/1.73M2 — LOW
EOSINOPHIL # BLD AUTO: 0.04 K/UL — SIGNIFICANT CHANGE UP (ref 0–0.5)
EOSINOPHIL NFR BLD AUTO: 0.5 % — SIGNIFICANT CHANGE UP (ref 0–6)
EPI CELLS # UR: ABNORMAL /HPF
GLUCOSE SERPL-MCNC: 231 MG/DL — HIGH (ref 70–99)
GLUCOSE UR QL: 1000 MG/DL
HCT VFR BLD CALC: 39.4 % — SIGNIFICANT CHANGE UP (ref 39–50)
HGB BLD-MCNC: 13.2 G/DL — SIGNIFICANT CHANGE UP (ref 13–17)
IMM GRANULOCYTES NFR BLD AUTO: 0.4 % — SIGNIFICANT CHANGE UP (ref 0–1.5)
KETONES UR-MCNC: NEGATIVE — SIGNIFICANT CHANGE UP
LEUKOCYTE ESTERASE UR-ACNC: NEGATIVE — SIGNIFICANT CHANGE UP
LIDOCAIN IGE QN: 98 U/L — SIGNIFICANT CHANGE UP (ref 73–393)
LYMPHOCYTES # BLD AUTO: 0.21 K/UL — LOW (ref 1–3.3)
LYMPHOCYTES # BLD AUTO: 2.8 % — LOW (ref 13–44)
MCHC RBC-ENTMCNC: 26.2 PG — LOW (ref 27–34)
MCHC RBC-ENTMCNC: 33.5 GM/DL — SIGNIFICANT CHANGE UP (ref 32–36)
MCV RBC AUTO: 78.3 FL — LOW (ref 80–100)
MONOCYTES # BLD AUTO: 0.65 K/UL — SIGNIFICANT CHANGE UP (ref 0–0.9)
MONOCYTES NFR BLD AUTO: 8.8 % — SIGNIFICANT CHANGE UP (ref 2–14)
NEUTROPHILS # BLD AUTO: 6.45 K/UL — SIGNIFICANT CHANGE UP (ref 1.8–7.4)
NEUTROPHILS NFR BLD AUTO: 87.2 % — HIGH (ref 43–77)
NITRITE UR-MCNC: NEGATIVE — SIGNIFICANT CHANGE UP
NRBC # BLD: 0 /100 WBCS — SIGNIFICANT CHANGE UP (ref 0–0)
PH UR: 7 — SIGNIFICANT CHANGE UP (ref 5–8)
PLATELET # BLD AUTO: 170 K/UL — SIGNIFICANT CHANGE UP (ref 150–400)
POTASSIUM SERPL-MCNC: 4.1 MMOL/L — SIGNIFICANT CHANGE UP (ref 3.5–5.3)
POTASSIUM SERPL-SCNC: 4.1 MMOL/L — SIGNIFICANT CHANGE UP (ref 3.5–5.3)
PROT SERPL-MCNC: 7.3 G/DL — SIGNIFICANT CHANGE UP (ref 6–8.3)
PROT UR-MCNC: 100
RBC # BLD: 5.03 M/UL — SIGNIFICANT CHANGE UP (ref 4.2–5.8)
RBC # FLD: 15.5 % — HIGH (ref 10.3–14.5)
SARS-COV-2 RNA SPEC QL NAA+PROBE: DETECTED
SODIUM SERPL-SCNC: 138 MMOL/L — SIGNIFICANT CHANGE UP (ref 135–145)
SP GR SPEC: 1 — LOW (ref 1.01–1.02)
UROBILINOGEN FLD QL: NEGATIVE — SIGNIFICANT CHANGE UP
WBC # BLD: 7.4 K/UL — SIGNIFICANT CHANGE UP (ref 3.8–10.5)
WBC # FLD AUTO: 7.4 K/UL — SIGNIFICANT CHANGE UP (ref 3.8–10.5)
WBC UR QL: SIGNIFICANT CHANGE UP /HPF (ref 0–5)

## 2022-06-22 PROCEDURE — 83690 ASSAY OF LIPASE: CPT

## 2022-06-22 PROCEDURE — 96375 TX/PRO/DX INJ NEW DRUG ADDON: CPT

## 2022-06-22 PROCEDURE — 87635 SARS-COV-2 COVID-19 AMP PRB: CPT

## 2022-06-22 PROCEDURE — 99284 EMERGENCY DEPT VISIT MOD MDM: CPT

## 2022-06-22 PROCEDURE — 80053 COMPREHEN METABOLIC PANEL: CPT

## 2022-06-22 PROCEDURE — 81001 URINALYSIS AUTO W/SCOPE: CPT

## 2022-06-22 PROCEDURE — 99284 EMERGENCY DEPT VISIT MOD MDM: CPT | Mod: 25

## 2022-06-22 PROCEDURE — 96374 THER/PROPH/DIAG INJ IV PUSH: CPT

## 2022-06-22 PROCEDURE — 85025 COMPLETE CBC W/AUTO DIFF WBC: CPT

## 2022-06-22 PROCEDURE — 36415 COLL VENOUS BLD VENIPUNCTURE: CPT

## 2022-06-22 PROCEDURE — 82962 GLUCOSE BLOOD TEST: CPT

## 2022-06-22 RX ORDER — ONDANSETRON 8 MG/1
4 TABLET, FILM COATED ORAL ONCE
Refills: 0 | Status: COMPLETED | OUTPATIENT
Start: 2022-06-22 | End: 2022-06-22

## 2022-06-22 RX ORDER — SODIUM CHLORIDE 9 MG/ML
1000 INJECTION INTRAMUSCULAR; INTRAVENOUS; SUBCUTANEOUS ONCE
Refills: 0 | Status: COMPLETED | OUTPATIENT
Start: 2022-06-22 | End: 2022-06-22

## 2022-06-22 RX ORDER — NIRMATRELVIR AND RITONAVIR 150-100 MG
3 KIT ORAL
Qty: 30 | Refills: 0
Start: 2022-06-22 | End: 2022-06-26

## 2022-06-22 RX ORDER — KETOROLAC TROMETHAMINE 30 MG/ML
30 SYRINGE (ML) INJECTION ONCE
Refills: 0 | Status: DISCONTINUED | OUTPATIENT
Start: 2022-06-22 | End: 2022-06-22

## 2022-06-22 RX ADMIN — ONDANSETRON 4 MILLIGRAM(S): 8 TABLET, FILM COATED ORAL at 21:54

## 2022-06-22 RX ADMIN — SODIUM CHLORIDE 1000 MILLILITER(S): 9 INJECTION INTRAMUSCULAR; INTRAVENOUS; SUBCUTANEOUS at 21:54

## 2022-06-22 RX ADMIN — Medication 30 MILLIGRAM(S): at 21:54

## 2022-06-22 NOTE — ED PROVIDER NOTE - PHYSICAL EXAMINATION
General: pt lying in stretcher, appears stated age and is not in distress  HEENT: AT/NC, pink conjunctiva, anicteric sclerae, EOMI, PERRLA, TMs smooth, grey, intact, with normal landmarks, nasal mucosa pink, no discharge, turbinates not enlarged; moist mucus membranes, tongue well-papillated, good dentition; posterior pharynx shows no erythema or exudates;   Neck: supple, full ROM, trachea midline, no JVD, no cervical LAD, no midline ttp or stepoffs  Lungs: symmetric excursion, b/l clear vesicular breath sounds with no wheezes, crackles, or rhonchi  Heart: rrr, S1, S2 normal; no S3 or S4; no murmurs or rubs  Abd: normal bowel sounds; soft, nontender; negative McBurney's point tenderness, negative Cohen's sign, no rebound, no guarding, spleen non-palpable; no hepatomegaly, no masses  Back: no midline spinal tenderness or stepoffs, no costovertebral angle tenderness  Extremities: no clubbing, cyanosis, or edema; no palpable deformities or fractures  Skin: good turgor; no rashes, petechiae, ecchymoses, or jaundice  Pulses: radial, posterior tibialis (PT), dorsalis pedis (DP) all 2+ & symmetric  Neuro: awake, alert, responsive; oriented to person, place and time; cranial nerves intact, EOMI, intact jaw movement, intact facial sensation, no facial asymmetry, hearing intact; no nystagmus, tongue midline; Motor: Normal tone in upper and lower extremities bilaterally strength 5/5; Sensory: intact to pinprick and light touch; Cerebellar: finger-to-nose intact; normal steady gait; negative Romberg’s sign; DTR: biceps, triceps, patellar, 2+, no pronator drift General: pt lying in stretcher, appears stated age and is not in distress  HEENT: AT/NC, pink conjunctiva, anicteric sclerae, EOMI, mmm  Neck: supple, full ROM, trachea midline, no JVD, no cervical LAD, no midline ttp or stepoffs  Lungs: symmetric excursion, b/l clear vesicular breath sounds with no wheezes, crackles, or rhonchi  Heart: rrr, S1, S2 normal; no S3 or S4; no murmurs or rubs  Abd: normal bowel sounds; soft, nontender; negative McBurney's point tenderness, negative Cohen's sign, no rebound, no guarding, spleen non-palpable; no hepatomegaly, no masses  Back: no midline spinal tenderness or stepoffs, no costovertebral angle tenderness  Extremities: no clubbing, cyanosis, or edema; no palpable deformities or fractures  Skin: good turgor; no rashes, petechiae, ecchymoses, or jaundice  Pulses: radial, posterior tibialis (PT), dorsalis pedis (DP) all 2+ & symmetric  Neuro: awake, alert, responsive; oriented to person, place and time; cranial nerves intact, EOMI, intact jaw movement, intact facial sensation, no facial asymmetry, hearing intact; no nystagmus, tongue midline; Motor: Normal tone in upper and lower extremities bilaterally strength 5/5; Sensory: intact

## 2022-06-22 NOTE — ED PROVIDER NOTE - CLINICAL SUMMARY MEDICAL DECISION MAKING FREE TEXT BOX
Pt w/ aforementioned presentation concerning for but not limited to __   Will get labs, imaging, treat symptoms, monitor and reassess. Pt w/ aforementioned presentation concerning for but not limited to gastroenteritis, other infection  Will get labs, treat symptoms, monitor and reassess.

## 2022-06-22 NOTE — ED ADULT NURSE NOTE - OBJECTIVE STATEMENT
Pt BIBA r/t abdominal pain, headache, nausea, vomiting, coughing and diarrhea x yesterday. PMHx T1DM

## 2022-06-22 NOTE — ED PROVIDER NOTE - OBJECTIVE STATEMENT
35 y/o male with PMHx of DM, c/o abdominal pain since 2PM. Patient reports he was at a BBQ earlier and had some food and subsequently developed generalized abdominal pain associated with diarrhea and vomiting. He reports this is different from his usual DKA and last time he had DKA fingerstick was 600-700s. Patient denies fever, chills, night sweats and no known sick contacts.

## 2022-06-22 NOTE — ED PROVIDER NOTE - PATIENT PORTAL LINK FT
You can access the FollowMyHealth Patient Portal offered by [facility] by registering at the following website: [email or web address]. By joining [employer]’s FollowMyHealth portal, you will also be able to view your health information using other applications (apps) compatible with our system.

## 2022-07-14 ENCOUNTER — INPATIENT (INPATIENT)
Facility: HOSPITAL | Age: 37
LOS: 0 days | Discharge: LEFT BEFORE DISCHARGE | DRG: 639 | End: 2022-07-14
Attending: INTERNAL MEDICINE | Admitting: INTERNAL MEDICINE
Payer: MEDICAID

## 2022-07-14 VITALS
TEMPERATURE: 99 F | HEART RATE: 99 BPM | SYSTOLIC BLOOD PRESSURE: 132 MMHG | RESPIRATION RATE: 18 BRPM | HEIGHT: 68 IN | DIASTOLIC BLOOD PRESSURE: 91 MMHG | WEIGHT: 149.91 LBS | OXYGEN SATURATION: 98 %

## 2022-07-14 VITALS
DIASTOLIC BLOOD PRESSURE: 90 MMHG | HEART RATE: 88 BPM | RESPIRATION RATE: 18 BRPM | TEMPERATURE: 99 F | SYSTOLIC BLOOD PRESSURE: 132 MMHG | OXYGEN SATURATION: 98 %

## 2022-07-14 DIAGNOSIS — E11.9 TYPE 2 DIABETES MELLITUS WITHOUT COMPLICATIONS: ICD-10-CM

## 2022-07-14 DIAGNOSIS — R41.82 ALTERED MENTAL STATUS, UNSPECIFIED: ICD-10-CM

## 2022-07-14 DIAGNOSIS — E16.2 HYPOGLYCEMIA, UNSPECIFIED: ICD-10-CM

## 2022-07-14 DIAGNOSIS — Z29.9 ENCOUNTER FOR PROPHYLACTIC MEASURES, UNSPECIFIED: ICD-10-CM

## 2022-07-14 DIAGNOSIS — M54.2 CERVICALGIA: ICD-10-CM

## 2022-07-14 LAB
ALBUMIN SERPL ELPH-MCNC: 3.5 G/DL — SIGNIFICANT CHANGE UP (ref 3.5–5)
ALP SERPL-CCNC: 108 U/L — SIGNIFICANT CHANGE UP (ref 40–120)
ALT FLD-CCNC: 18 U/L DA — SIGNIFICANT CHANGE UP (ref 10–60)
ANION GAP SERPL CALC-SCNC: 8 MMOL/L — SIGNIFICANT CHANGE UP (ref 5–17)
APPEARANCE UR: CLEAR — SIGNIFICANT CHANGE UP
AST SERPL-CCNC: 34 U/L — SIGNIFICANT CHANGE UP (ref 10–40)
BASOPHILS # BLD AUTO: 0.02 K/UL — SIGNIFICANT CHANGE UP (ref 0–0.2)
BASOPHILS NFR BLD AUTO: 0.2 % — SIGNIFICANT CHANGE UP (ref 0–2)
BILIRUB SERPL-MCNC: 1.3 MG/DL — HIGH (ref 0.2–1.2)
BILIRUB UR-MCNC: NEGATIVE — SIGNIFICANT CHANGE UP
BUN SERPL-MCNC: 9 MG/DL — SIGNIFICANT CHANGE UP (ref 7–18)
CALCIUM SERPL-MCNC: 9 MG/DL — SIGNIFICANT CHANGE UP (ref 8.4–10.5)
CHLORIDE SERPL-SCNC: 108 MMOL/L — SIGNIFICANT CHANGE UP (ref 96–108)
CO2 SERPL-SCNC: 22 MMOL/L — SIGNIFICANT CHANGE UP (ref 22–31)
COLOR SPEC: YELLOW — SIGNIFICANT CHANGE UP
CREAT SERPL-MCNC: 1.21 MG/DL — SIGNIFICANT CHANGE UP (ref 0.5–1.3)
DIFF PNL FLD: ABNORMAL
EGFR: 79 ML/MIN/1.73M2 — SIGNIFICANT CHANGE UP
EOSINOPHIL # BLD AUTO: 0.01 K/UL — SIGNIFICANT CHANGE UP (ref 0–0.5)
EOSINOPHIL NFR BLD AUTO: 0.1 % — SIGNIFICANT CHANGE UP (ref 0–6)
ETHANOL SERPL-MCNC: 5 MG/DL — SIGNIFICANT CHANGE UP (ref 0–10)
GLUCOSE BLDC GLUCOMTR-MCNC: 215 MG/DL — HIGH (ref 70–99)
GLUCOSE BLDC GLUCOMTR-MCNC: 275 MG/DL — HIGH (ref 70–99)
GLUCOSE BLDC GLUCOMTR-MCNC: 322 MG/DL — HIGH (ref 70–99)
GLUCOSE SERPL-MCNC: 21 MG/DL — CRITICAL LOW (ref 70–99)
GLUCOSE UR QL: 100 MG/DL
HCT VFR BLD CALC: 42.2 % — SIGNIFICANT CHANGE UP (ref 39–50)
HGB BLD-MCNC: 13.9 G/DL — SIGNIFICANT CHANGE UP (ref 13–17)
IMM GRANULOCYTES NFR BLD AUTO: 0.3 % — SIGNIFICANT CHANGE UP (ref 0–1.5)
KETONES UR-MCNC: NEGATIVE — SIGNIFICANT CHANGE UP
LEUKOCYTE ESTERASE UR-ACNC: NEGATIVE — SIGNIFICANT CHANGE UP
LYMPHOCYTES # BLD AUTO: 1.28 K/UL — SIGNIFICANT CHANGE UP (ref 1–3.3)
LYMPHOCYTES # BLD AUTO: 10.3 % — LOW (ref 13–44)
MAGNESIUM SERPL-MCNC: 2.1 MG/DL — SIGNIFICANT CHANGE UP (ref 1.6–2.6)
MCHC RBC-ENTMCNC: 26.5 PG — LOW (ref 27–34)
MCHC RBC-ENTMCNC: 32.9 GM/DL — SIGNIFICANT CHANGE UP (ref 32–36)
MCV RBC AUTO: 80.5 FL — SIGNIFICANT CHANGE UP (ref 80–100)
MONOCYTES # BLD AUTO: 0.74 K/UL — SIGNIFICANT CHANGE UP (ref 0–0.9)
MONOCYTES NFR BLD AUTO: 6 % — SIGNIFICANT CHANGE UP (ref 2–14)
NEUTROPHILS # BLD AUTO: 10.29 K/UL — HIGH (ref 1.8–7.4)
NEUTROPHILS NFR BLD AUTO: 83.1 % — HIGH (ref 43–77)
NITRITE UR-MCNC: NEGATIVE — SIGNIFICANT CHANGE UP
NRBC # BLD: 0 /100 WBCS — SIGNIFICANT CHANGE UP (ref 0–0)
PCP SPEC-MCNC: SIGNIFICANT CHANGE UP
PH UR: 6 — SIGNIFICANT CHANGE UP (ref 5–8)
PLATELET # BLD AUTO: 180 K/UL — SIGNIFICANT CHANGE UP (ref 150–400)
POTASSIUM SERPL-MCNC: 5.5 MMOL/L — HIGH (ref 3.5–5.3)
POTASSIUM SERPL-SCNC: 5.5 MMOL/L — HIGH (ref 3.5–5.3)
PROT SERPL-MCNC: 7.5 G/DL — SIGNIFICANT CHANGE UP (ref 6–8.3)
PROT UR-MCNC: 100
RBC # BLD: 5.24 M/UL — SIGNIFICANT CHANGE UP (ref 4.2–5.8)
RBC # FLD: 15.9 % — HIGH (ref 10.3–14.5)
SARS-COV-2 RNA SPEC QL NAA+PROBE: SIGNIFICANT CHANGE UP
SODIUM SERPL-SCNC: 138 MMOL/L — SIGNIFICANT CHANGE UP (ref 135–145)
SP GR SPEC: 1.01 — SIGNIFICANT CHANGE UP (ref 1.01–1.02)
UROBILINOGEN FLD QL: NEGATIVE — SIGNIFICANT CHANGE UP
WBC # BLD: 12.38 K/UL — HIGH (ref 3.8–10.5)
WBC # FLD AUTO: 12.38 K/UL — HIGH (ref 3.8–10.5)

## 2022-07-14 PROCEDURE — 99284 EMERGENCY DEPT VISIT MOD MDM: CPT

## 2022-07-14 RX ORDER — INSULIN NPH HUM/REG INSULIN HM 70-30/ML
30 VIAL (ML) SUBCUTANEOUS
Qty: 0 | Refills: 0 | DISCHARGE

## 2022-07-14 RX ORDER — LIDOCAINE 4 G/100G
10 CREAM TOPICAL ONCE
Refills: 0 | Status: COMPLETED | OUTPATIENT
Start: 2022-07-14 | End: 2022-07-14

## 2022-07-14 RX ORDER — DEXAMETHASONE 0.5 MG/5ML
5 ELIXIR ORAL ONCE
Refills: 0 | Status: COMPLETED | OUTPATIENT
Start: 2022-07-14 | End: 2022-07-14

## 2022-07-14 RX ORDER — DIPHENHYDRAMINE HCL 50 MG
50 CAPSULE ORAL ONCE
Refills: 0 | Status: COMPLETED | OUTPATIENT
Start: 2022-07-14 | End: 2022-07-14

## 2022-07-14 RX ORDER — AMPICILLIN SODIUM AND SULBACTAM SODIUM 250; 125 MG/ML; MG/ML
3 INJECTION, POWDER, FOR SUSPENSION INTRAMUSCULAR; INTRAVENOUS ONCE
Refills: 0 | Status: COMPLETED | OUTPATIENT
Start: 2022-07-14 | End: 2022-07-14

## 2022-07-14 RX ORDER — KETOROLAC TROMETHAMINE 30 MG/ML
30 SYRINGE (ML) INJECTION ONCE
Refills: 0 | Status: DISCONTINUED | OUTPATIENT
Start: 2022-07-14 | End: 2022-07-14

## 2022-07-14 RX ORDER — IBUPROFEN 200 MG
600 TABLET ORAL ONCE
Refills: 0 | Status: COMPLETED | OUTPATIENT
Start: 2022-07-14 | End: 2022-07-14

## 2022-07-14 RX ORDER — IBUPROFEN 200 MG
1 TABLET ORAL
Qty: 30 | Refills: 0
Start: 2022-07-14 | End: 2022-07-23

## 2022-07-14 RX ORDER — DEXTROSE 50 % IN WATER 50 %
50 SYRINGE (ML) INTRAVENOUS ONCE
Refills: 0 | Status: COMPLETED | OUTPATIENT
Start: 2022-07-14 | End: 2022-07-14

## 2022-07-14 RX ORDER — ENOXAPARIN SODIUM 100 MG/ML
40 INJECTION SUBCUTANEOUS EVERY 24 HOURS
Refills: 0 | Status: DISCONTINUED | OUTPATIENT
Start: 2022-07-14 | End: 2022-07-14

## 2022-07-14 RX ORDER — PANTOPRAZOLE SODIUM 20 MG/1
40 TABLET, DELAYED RELEASE ORAL
Refills: 0 | Status: DISCONTINUED | OUTPATIENT
Start: 2022-07-14 | End: 2022-07-14

## 2022-07-14 RX ORDER — SODIUM CHLORIDE 9 MG/ML
1000 INJECTION INTRAMUSCULAR; INTRAVENOUS; SUBCUTANEOUS ONCE
Refills: 0 | Status: COMPLETED | OUTPATIENT
Start: 2022-07-14 | End: 2022-07-14

## 2022-07-14 RX ORDER — HALOPERIDOL DECANOATE 100 MG/ML
5 INJECTION INTRAMUSCULAR ONCE
Refills: 0 | Status: COMPLETED | OUTPATIENT
Start: 2022-07-14 | End: 2022-07-14

## 2022-07-14 RX ORDER — DEXAMETHASONE 0.5 MG/5ML
8 ELIXIR ORAL ONCE
Refills: 0 | Status: COMPLETED | OUTPATIENT
Start: 2022-07-14 | End: 2022-07-14

## 2022-07-14 RX ORDER — SODIUM CHLORIDE 9 MG/ML
1000 INJECTION, SOLUTION INTRAVENOUS ONCE
Refills: 0 | Status: COMPLETED | OUTPATIENT
Start: 2022-07-14 | End: 2022-07-14

## 2022-07-14 RX ORDER — SODIUM CHLORIDE 9 MG/ML
1000 INJECTION, SOLUTION INTRAVENOUS
Refills: 0 | Status: DISCONTINUED | OUTPATIENT
Start: 2022-07-14 | End: 2022-07-14

## 2022-07-14 RX ADMIN — Medication 50 MILLILITER(S): at 09:26

## 2022-07-14 RX ADMIN — Medication 5 MILLIGRAM(S): at 07:00

## 2022-07-14 RX ADMIN — SODIUM CHLORIDE 100 MILLILITER(S): 9 INJECTION, SOLUTION INTRAVENOUS at 09:55

## 2022-07-14 RX ADMIN — HALOPERIDOL DECANOATE 5 MILLIGRAM(S): 100 INJECTION INTRAMUSCULAR at 03:48

## 2022-07-14 RX ADMIN — Medication 30 MILLIGRAM(S): at 07:20

## 2022-07-14 RX ADMIN — Medication 2 MILLIGRAM(S): at 03:48

## 2022-07-14 RX ADMIN — Medication 50 MILLIGRAM(S): at 03:49

## 2022-07-14 RX ADMIN — AMPICILLIN SODIUM AND SULBACTAM SODIUM 200 GRAM(S): 250; 125 INJECTION, POWDER, FOR SUSPENSION INTRAMUSCULAR; INTRAVENOUS at 07:00

## 2022-07-14 RX ADMIN — Medication 50 MILLILITER(S): at 06:45

## 2022-07-14 RX ADMIN — SODIUM CHLORIDE 1000 MILLILITER(S): 9 INJECTION INTRAMUSCULAR; INTRAVENOUS; SUBCUTANEOUS at 07:00

## 2022-07-14 RX ADMIN — Medication 30 MILLIGRAM(S): at 07:00

## 2022-07-14 NOTE — H&P ADULT - NSHPPHYSICALEXAM_GEN_ALL_CORE
GENERAL: NAD, lying in bed comfortably, patient was sleepy during examination, warm upon exam  HEAD:  Atraumatic, Normocephalic  EYES: could not be examined as patient was sleepy and non-cooperative  ENT: Moist mucous membranes  NECK: Supple, No JVD, mild tenderness over the RT neck, No lymph node enlargement   CHEST/LUNG: Clear to auscultation bilaterally; No rales, rhonchi, wheezing, or rubs. Unlabored respirations  HEART: Regular rate and rhythm; No murmurs, rubs, or gallops  ABDOMEN: Bowel sounds present; Soft, Nontender, Nondistended  EXTREMITIES:  2+ Peripheral Pulses, brisk capillary refill. No clubbing, cyanosis, or edema  NERVOUS SYSTEM:  Alert & Oriented X3, speech clear. No deficits   MSK: FROM all 4 extremities, full and equal strength  SKIN: No rashes or lesions

## 2022-07-14 NOTE — H&P ADULT - PROBLEM SELECTOR PLAN 2
As per patient, he takes Lantus 30 Units but as per patient's pharmacy he has prescription for HumuLin 70/30, 30 units bid  - Pt has an episode of hypoglycemia ( Blood glucose: 44 at 9:21 AM in ED)  - Leukocytosis (WBC count: 12.38)  - u/a: -ve   - Hold Insulin  - f/u C-peptide  - f/u urine culture?   - f/u HbA1C  - Consult Endo As per patient, he takes Lantus 30 Units but as per patient's pharmacy he has prescription for HumuLin 70/30, 30 units bid  - Pt has an episode of hypoglycemia ( Blood glucose: 44 at 9:21 AM in ED)  - Leukocytosis (WBC count: 12.38)  - u/a: -ve   - Hold Insulin  - f/u C-peptide   - f/u HbA1C  - Endocrinology consulted Dr. Delcid

## 2022-07-14 NOTE — DISCHARGE NOTE NURSING/CASE MANAGEMENT/SOCIAL WORK - PATIENT PORTAL LINK FT
You can access the FollowMyHealth Patient Portal offered by [facility] by registering at the following website: [email or web address]. By joining [employer]’s FollowMyHealth portal, you will also be able to view your health information using other applications (apps) compatible with our system.

## 2022-07-14 NOTE — DISCHARGE NOTE NURSING/CASE MANAGEMENT/SOCIAL WORK - NSDCPEFALRISK_GEN_ALL_CORE
For information on Fall & Injury Prevention, visit: [email or web address] OR  [email or web address] OR  https://www.cdc.gov/steadi/patient.html

## 2022-07-14 NOTE — ED PROVIDER NOTE - OBJECTIVE STATEMENT
36 year old male PMH DM1 coming in with 2 days of right sided sore throat and swelling to right neck. states hasn't taken any medication for the symptoms. states pain with swallowing. denies all other complaints.

## 2022-07-14 NOTE — H&P ADULT - NSHPREVIEWOFSYSTEMS_GEN_ALL_CORE
REVIEW OF SYSTEMS:    CONSTITUTIONAL: No weakness, fevers or chills  EYES/ENT: No visual changes;  No vertigo , has mild throat pain   NECK: has pain over the RT side, no stiffness  RESPIRATORY: No cough, wheezing, hemoptysis; No shortness of breath  CARDIOVASCULAR: No chest pain or palpitations  GASTROINTESTINAL: No abdominal or epigastric pain. No nausea, vomiting, or hematemesis; No diarrhea or constipation. No melena or hematochezia.  GENITOURINARY: No dysuria, frequency or hematuria  NEUROLOGICAL: No numbness or weakness  SKIN: No itching, rashes

## 2022-07-14 NOTE — H&P ADULT - ASSESSMENT
31 yrs old male with hx of T1DM presented with neck pain and sore throat. Admit to medicine for hypoglycemia, neck and throat pain. 36 yrs old male with hx of T1DM presented with neck pain and sore throat. Admit to medicine for hypoglycemia, neck and throat pain.

## 2022-07-14 NOTE — ED ADULT NURSE REASSESSMENT NOTE - NS ED NURSE REASSESS COMMENT FT1
Patient became agitated, destroying hospital properties, combative, attacking other patients and staff members. Code gray call at approximately [id number]. Verbal order received from MD. [place] to initiate restraints for the pt's safety and staff.

## 2022-07-14 NOTE — ED PROVIDER NOTE - PROGRESS NOTE DETAILS
while waiting for medications pt became beliggerant and was running around the ED and jumping on stretchers. pt not responding to verbal de-escalation measures. pt trying to hit and bite staff. code franc called. pt given sedation. will reassess glucose 35. labs/dextrose ordered repeat fs 44. dextrose given. d-5w drip given. admit for prolonged ams

## 2022-07-14 NOTE — H&P ADULT - PROBLEM SELECTOR PLAN 3
Pt has RT sided neck pain, without any neck stiffness. He has mild sore throat with No dysphagia, fever, cough, SOB.  - Leukocytosis (WBC count: 12.38)  - COVID: -ve   - DDX: Upper respiratory infection vs thyroiditis vs abscess  -  ml/12 hrs   - throat culture?  - f/u lactate ?  - f/u TSH, T3, T4 Pt has RT sided neck pain, without any neck stiffness. He has mild sore throat with No dysphagia, fever, cough, SOB.  - Leukocytosis (WBC count: 12.38)  - COVID: -ve   - DDX: Upper respiratory infection vs thyroiditis vs abscess  -  ml/12 hrs   - throat culture?  - f/u lactate ?  - f/u TSH Pt has RT sided neck pain, without any neck stiffness. He has mild sore throat with No dysphagia, fever, cough, SOB.  - Leukocytosis (WBC count: 12.38)  - COVID: -ve   - DDX: Upper respiratory infection vs thyroiditis vs abscess  -  ml/12 hrs   - f/u lactate   - f/u TSH

## 2022-07-14 NOTE — ED ADULT NURSE REASSESSMENT NOTE - NS ED NURSE REASSESS COMMENT FT1
Patient experienced an episode of hypoglycemia of 31mg/DL. MD. made aware, Dextrose 50 given. pt was asymptomatic. Repeat f/s was 201mg/DL. Pt remains in stable conditions no distress noted. Report endorsed to DEIDRE Alfonso. Patient experienced an episode of hypoglycemia of 31mg/DL. MD. made aware, Dextrose 50 given. pt was asymptomatic. Repeat f/s was 201mg/DL. Pt remains in stable conditions no distress noted.

## 2022-07-14 NOTE — H&P ADULT - ATTENDING COMMENTS
31 yrs old male with hx of T1DM presented with neck pain and sore throat. Pt stated that the neck pain started about 2 days ago over the right side, stabbing in nature with intensity of 8/10 with no-radiation. He has mild sore throat but denied any dysphagia, fever, chills, nausea vomiting, headache.  He denied any SOB, chest pain, cough. Denied any urinary symptoms, changes in bowel motion.    Of Note: Pt had Positive COVID test on June 22nd. He has not been vaccinated for COVID.     assessment  --  uncontrolled dm type 1 with with hypoglycemic episodes, sore throat, h/o dm type 1    plan  --  admit to med, lantus, lispro ss, gi and dvt prophylaxis, ivf d5w  cbc, bmp, mg, phos, lipids, tsh, hgba1c,        endo cons 31 yrs old male with hx of T1DM presented with neck pain and sore throat. Pt stated that the neck pain started about 2 days ago over the right side, stabbing in nature with intensity of 8/10 with no-radiation. He has mild sore throat but denied any dysphagia, fever, chills, nausea vomiting, headache.  He denied any SOB, chest pain, cough. Denied any urinary symptoms, changes in bowel motion. pt stated that because of his sorethroat he did not eat but took his insulin.    Of Note: Pt had Positive COVID test on June 22nd. He has not been vaccinated for COVID.     assessment  --  uncontrolled dm type 1 with with hypoglycemic episodes, sore throat, h/o dm type 1    plan  --  admit to med, lantus, lispro ss, gi and dvt prophylaxis, ivf d5w  cbc, bmp, mg, phos, lipids, tsh, hgba1c,        endo cons

## 2022-07-14 NOTE — DISCHARGE NOTE PROVIDER - NSDCMRMEDTOKEN_GEN_ALL_CORE_FT
amoxicillin-clavulanate 875 mg-125 mg oral tablet: 1 tab(s) orally every 12 hours   HumuLIN 70/30 Pen subcutaneous suspension: 30 unit(s) subcutaneous 2 times a day

## 2022-07-14 NOTE — H&P ADULT - NSHPSOCIALHISTORY_GEN_ALL_CORE
Patient smokes Marijuana x5 daily. He did not reply to the alcohol or illicit drug consumption. Patient is not vaccinated for COVID.

## 2022-07-14 NOTE — ED ADULT NURSE NOTE - ED STAT RN HANDOFF DETAILS
Patient experienced an episode of hypoglycemia of 31mg/DL. MD. made aware, Dextrose 50 given. pt was asymptomatic. Repeat f/s was 201mg/DL. Pt remains in stable conditions no distress noted. Report endorsed to DEIDRE Alfonso.

## 2022-07-14 NOTE — DISCHARGE NOTE PROVIDER - NSDCCPCAREPLAN_GEN_ALL_CORE_FT
PRINCIPAL DISCHARGE DIAGNOSIS  Diagnosis: Hypoglycemia  Assessment and Plan of Treatment: You presented to the Emergency rooms with right throat pain but you were found to have severely low sugar to 21. You were given fluids with sugar in it and IV sugar and your blood sugar improved. You were tolerating diet. You were admitted to the hospital but you wanted to leave Against medical advice (AMA).  You wished to leave against medical advice. Careful explanation was given to you about the dangers of leaving. This dangers include and are not limited to  respiratory decompensation, chest pain leading to cardiac arrest and even death. Even after discussing this you decided to leave AMA. PLEASE FOLLOW WITH YOUR PRIMARY CARE PROVIDER.      SECONDARY DISCHARGE DIAGNOSES  Diagnosis: Throat pain  Assessment and Plan of Treatment: You came in with throat pain. You were given a prescription for Augmentin from your prior hospitalization. PLEASE CONTINUE TO TAKE THIS MEDICATION. You were admitted to the hospital but you wanted to leave Against medical advice (AMA).  You wished to leave against medical advice. Careful explanation was given to you about the dangers of leaving. This dangers include and are not limited to  respiratory decompensation, chest pain leading to cardiac arrest and even death. Even after discussing this you decided to leave AMA. PLEASE FOLLOW WITH YOUR PRIMARY CARE PROVIDER.    Diagnosis: Hypoglycemia  Assessment and Plan of Treatment:

## 2022-07-14 NOTE — H&P ADULT - HISTORY OF PRESENT ILLNESS
31 yrs old male with hx of T1DM presented with neck pain and sore throat. Pt stated that the neck pain started about 2 days ago over the right side, stabbing in nature with intensity of 8/10 with no-radiation. He has mild sore throat but denied any dysphagia, fever, chills, nausea vomiting, headache.  He denied any SOB, chest pain, cough. Denied any urinary symptoms, changes in bowel motion.    Of Note: Pt had Positive COVID test on June 22nd. He has not been vaccinated for COVID. 36 yrs old male with hx of T1DM presented with neck pain and sore throat. Pt stated that the neck pain started about 2 days ago over the right side, stabbing in nature with intensity of 8/10 with no-radiation. He has mild sore throat but denied any dysphagia, fever, chills, nausea vomiting, headache.  He denied any SOB, chest pain, cough. Denied any urinary symptoms, changes in bowel motion.    Of Note: Pt had Positive COVID test on June 22nd. He has not been vaccinated for COVID.

## 2022-07-14 NOTE — H&P ADULT - PROBLEM SELECTOR PLAN 1
As per patient, he takes Lantus 30 Units but as per patient's pharmacy he has prescription for HumuLin 70/30, 30 units bid  - Pt has an episode of hypoglycemia ( Blood glucose: 44 at 9:21 AM in ED)  - DDX: infection vs insulin-induced hypoglycemia  - Leukocytosis (WBC count: 12.38)  - u/a: -ve   - Hold Insulin  - f/u C-peptide  - f/u urine culture?   - f/u HbA1C  - Consult Endo As per patient, he takes Lantus 30 Units but as per patient's pharmacy he has prescription for HumuLin 70/30, 30 units bid  - Pt has an episode of hypoglycemia ( Blood glucose: 44 at 9:21 AM in ED)  - DDX: infection vs insulin-induced hypoglycemia  - Leukocytosis (WBC count: 12.38)  - u/a: -ve   - Hold Insulin  - f/u C-peptide  - f/u urine culture  - f/u HbA1C  - Endocrinology Dr. Delcid consulted

## 2022-07-14 NOTE — ED ADULT NURSE REASSESSMENT NOTE - NS ED NURSE REASSESS COMMENT FT1
Patient Refused po meds ordered. Started to roll on bed,  became agitated, jumping from bed to bed, destroying hospital properties, attacking patients and staff. Verbal redirections provided with poor results.  MD. made aware of behavior. Sedations administered.

## 2022-07-14 NOTE — DISCHARGE NOTE PROVIDER - HOSPITAL COURSE
36 yrs old male with hx of T1DM presented with neck pain and sore throat. Pt stated that the neck pain started about 2 days ago over the right side, stabbing in nature with intensity of 8/10 with no-radiation. He has mild sore throat but denied any dysphagia, fever, chills, nausea vomiting, headache.  He denied any SOB, chest pain, cough. Denied any urinary symptoms, changes in bowel motion. Upon evaluation in ED, patient found to be hypoglycemic to 21. Patient given dextrose and IV fluids with dextrose. Patient admitted to medicine for hypoglycemia. Patient wishes to leave against medical advice. Careful explanation is given to the patient/family members about the dangers of leaving. This dangers include and are not limited to  respiratory decompensation, chest pain leading to cardiac arrest and even death. Even after discussing this patient decided to leave AMA.      Case discussed with medical attending. Fir further details please see medical chart.

## 2022-07-15 LAB
CULTURE RESULTS: SIGNIFICANT CHANGE UP
SPECIMEN SOURCE: SIGNIFICANT CHANGE UP

## 2022-08-11 PROCEDURE — 81001 URINALYSIS AUTO W/SCOPE: CPT

## 2022-08-11 PROCEDURE — 87086 URINE CULTURE/COLONY COUNT: CPT

## 2022-08-11 PROCEDURE — 80307 DRUG TEST PRSMV CHEM ANLYZR: CPT

## 2022-08-11 PROCEDURE — 83735 ASSAY OF MAGNESIUM: CPT

## 2022-08-11 PROCEDURE — 80053 COMPREHEN METABOLIC PANEL: CPT

## 2022-08-11 PROCEDURE — 96372 THER/PROPH/DIAG INJ SC/IM: CPT

## 2022-08-11 PROCEDURE — 87635 SARS-COV-2 COVID-19 AMP PRB: CPT

## 2022-08-11 PROCEDURE — 85025 COMPLETE CBC W/AUTO DIFF WBC: CPT

## 2022-08-11 PROCEDURE — 99285 EMERGENCY DEPT VISIT HI MDM: CPT | Mod: 25

## 2022-08-11 PROCEDURE — 36415 COLL VENOUS BLD VENIPUNCTURE: CPT

## 2022-08-11 PROCEDURE — 82962 GLUCOSE BLOOD TEST: CPT

## 2023-08-05 ENCOUNTER — EMERGENCY (EMERGENCY)
Facility: HOSPITAL | Age: 38
LOS: 1 days | Discharge: LEFT BEFORE DISCHARGE | End: 2023-08-05
Payer: COMMERCIAL

## 2023-08-05 VITALS
TEMPERATURE: 97 F | RESPIRATION RATE: 18 BRPM | DIASTOLIC BLOOD PRESSURE: 72 MMHG | SYSTOLIC BLOOD PRESSURE: 148 MMHG | OXYGEN SATURATION: 99 % | HEART RATE: 88 BPM

## 2023-08-05 PROCEDURE — L9992: CPT

## 2023-08-05 NOTE — ED ADULT TRIAGE NOTE - PAIN: PRESENCE, MLM
How Severe Is Your Rash?: moderate Is This A New Presentation, Or A Follow-Up?: Rash Additional History: Patient stated that he sent us photos through the portal denies pain/discomfort (Rating = 0)

## 2023-08-05 NOTE — ED ADULT TRIAGE NOTE - CHIEF COMPLAINT QUOTE
EMS states took his own insulin  without  checking blood sugar, became confused  girlfriend called 911, glucagon IM given , D10  25 gms IV given prior to ED latest BS 163mg , pt alert oriented x 4 on ER arrival

## 2024-08-20 NOTE — ED PROVIDER NOTE - TOBACCO USE
HCC coding opportunities       Chart reviewed, no opportunity found: CHART REVIEWED, NO OPPORTUNITY FOUND        Patients Insurance     Medicare Insurance: Medicare           Never smoker

## 2025-03-24 NOTE — ED PROVIDER NOTE - DATE/TIME 1
Called pt reports that a few months ago she was taking OCP continuously and now pharmacy will not dispense more medication \"as it is too soon for a refill\" pt states that since her last VV w/ Dr Reno 2/28/25 she is no longer taking OCP continuously and will need OCP refill as she is due to start a new pack later this week. Informed pt that RN will have to call pharmacy to find out exactly what they need from us and will call her back tomorrow with response. Pt verbalizes understanding and has no other questions.    22-Dec-2021 09:42